# Patient Record
Sex: MALE | Race: WHITE | NOT HISPANIC OR LATINO | Employment: FULL TIME
[De-identification: names, ages, dates, MRNs, and addresses within clinical notes are randomized per-mention and may not be internally consistent; named-entity substitution may affect disease eponyms.]

---

## 2020-03-05 ENCOUNTER — RECORDS - HEALTHEAST (OUTPATIENT)
Dept: ADMINISTRATIVE | Facility: OTHER | Age: 45
End: 2020-03-05

## 2020-03-12 ENCOUNTER — RECORDS - HEALTHEAST (OUTPATIENT)
Dept: ADMINISTRATIVE | Facility: OTHER | Age: 45
End: 2020-03-12

## 2020-03-26 ENCOUNTER — RECORDS - HEALTHEAST (OUTPATIENT)
Dept: ADMINISTRATIVE | Facility: OTHER | Age: 45
End: 2020-03-26

## 2020-08-20 ENCOUNTER — OFFICE VISIT - HEALTHEAST (OUTPATIENT)
Dept: FAMILY MEDICINE | Facility: CLINIC | Age: 45
End: 2020-08-20

## 2020-08-20 DIAGNOSIS — M54.12 CERVICAL NEUROPATHIC PAIN: ICD-10-CM

## 2020-08-20 DIAGNOSIS — F17.200 TOBACCO USE DISORDER: ICD-10-CM

## 2021-05-30 ENCOUNTER — HEALTH MAINTENANCE LETTER (OUTPATIENT)
Age: 46
End: 2021-05-30

## 2021-06-04 VITALS
WEIGHT: 182 LBS | DIASTOLIC BLOOD PRESSURE: 60 MMHG | RESPIRATION RATE: 12 BRPM | SYSTOLIC BLOOD PRESSURE: 108 MMHG | HEART RATE: 84 BPM | BODY MASS INDEX: 25.75 KG/M2

## 2021-06-10 NOTE — PROGRESS NOTES
Subjective:       Constance Frost is a 45 y.o. male who presents for evaluation of left sided neck pain radiating to the left shoulder. Event that precipitated these symptoms: none known, Patient questions if he might of slept on it wrong. Onset of symptoms was 1 week ago, and have been unchanged since that time. Current symptoms are pain in left lateral neck and medial to left shoulder blade (aching and burning in character; 8/10 in severity). Patient denies Upper extremity weakness, numbness, or tingling or headaches.  Patient has had recurrent self limited episodes of neck pain in the past. Previous treatments: physical therapy for previous episodes of neck pain.  He has the exercises that were previously recommended to him, and we will continue to try to perform these as pain improves.  Patient also is considering consulting with his sister's chiropractor to see if adjustment can be helpful.   The following portions of the patient's history were reviewed and updated as appropriate: allergies, current medications, past social history and problem list.    Review of Systems  Pertinent items are noted in HPI.      Objective:      /60   Pulse 84   Resp 12   Wt 182 lb (82.6 kg)   BMI 25.75 kg/m    General:   alert, appears stated age, cooperative and no distress   External Deformity:  absent   ROM Cervical Spine:  flexion to 45 degrees, extension to minimal degrees, left rotation to 80 degrees, right rotation to 40 degrees, left lateral bending to 45 degrees and right lateral bending to 45 degrees   Midline Tenderness:  absent midline   Paraspinous tenderness:  moderate on the left   UE Neurologic Exam:  normal strength, normal sensation, normal reflexes     X-ray of the cervical spine: Not indicated      Assessment:        cervical strain with left radiculopathy     Plan:      Discussed the cervical pain, its course and treatment.    Discussed appropriate exercises as per previous physical therapy regimen.       Given lack of improvement with use of over-the-counter NSAIDs and persistent radicular component to patient's pain, he is prescribed a Medrol Dosepak to be taken each morning for the next 7 days with food.  Reviewed potential side effects of this medication as well as reasonable expectations of it.     Consider chiropractic visit if symptoms are not improved with above measures. Discussed apprehension regarding aggressive adjustment of c-spine.    Follow up in 1-2 weeks if symptoms are not improving.    Jaciel Silver MD

## 2021-06-18 NOTE — PATIENT INSTRUCTIONS - HE
Patient Instructions by Jaciel Silver MD at 8/20/2020  3:20 PM     Author: Jaciel Silver MD Service: -- Author Type: Physician    Filed: 8/24/2020  7:01 PM Encounter Date: 8/20/2020 Status: Signed    : Jaciel Silver MD (Physician)       Patient Education     Understanding Cervical Radiculopathy    Cervical radiculopathy is irritation or inflammation of a nerve root in the neck. It causes neck pain and other symptoms that may spread into the chest or down the arm. To understand this condition, it helps to understand the parts of the spine:    Vertebrae. These are bones that stack to form the spine. The cervical spine contains the 7 vertebrae in the neck.    Disks. These are soft pads of tissue between the vertebrae. They act as shock absorbers for the spine.    The spinal canal. This is a tunnel formed within the stacked vertebrae. The spinal cord runs through this canal.    Nerves. These branch off the spinal cord. As they leave the spinal canal, nerves pass through openings between the vertebrae. The nerve root is the part of the nerve that is closest to the spinal cord.   With cervical radiculopathy, nerve roots in the neck become irritated. This leads to pain and symptoms that can travel to the nerves that go from the spinal cord down the arms and into the torso.  What causes cervical radiculopathy?  Aging, injury, poor posture, and other issues can lead to problems in the neck. These problems may then irritate nerve roots. These include:    Damage to a disk in the cervical spine. The damaged disk may then press on nearby nerve roots.    Degeneration from wear and tear, and aging. This can lead to narrowing (stenosis) of the openings between the vertebrae. The narrowed openings press on nerve roots as they leave the spinal canal.    An unstable spine. This is when a vertebra slips forward. It can then press on a nerve root.  There are other, less common causes of pressure on nerves in  the neck. These include infection, cysts, and tumors.  Symptoms of cervical radiculopathy  These include:    Neck pain    Pain, numbness, tingling, or weakness that travels down the arm    Loss of neck movement    Muscle spasms  Treatment for cervical radiculopathy  In most cases, your healthcare provider will first try treatments that help relieve symptoms. These may include:    Prescription or over-the-counter pain medicines. These help relieve pain and swelling.    Cold packs. These help reduce pain.    Resting. This involves avoiding positions and activities that increase pain.    Neck brace (cervical collar). This can help relieve inflammation and pain.    Physical therapy, including exercises and stretches. This can help decrease pain and increase movement and function.    Shots of medicinesaround the nerve roots. This is done to help relieve symptoms for a time.  In some cases, your healthcare provider may advise surgery to fix the underlying problem. This depends on the cause, the symptoms, and how long the pain has lasted.  Possible complications  Over time, an irritated and inflamed nerve may become damaged. This may lead to long-lasting (permanent) numbness or weakness. If symptoms change suddenly or get worse, be sure to let your healthcare provider know.     When to call your healthcare provider  Call your healthcare provider right away if you have any of these:    New pain or pain that gets worse    New or increasing weakness, numbness, or tingling in your arm or hand    Bowel or bladder changes   Date Last Reviewed: 3/10/2016    6387-3931 [employer]. [address] [place] [address], [place] [address]. All rights reserved. This information is not intended as a substitute for professional medical care. Always follow your healthcare professional's instructions.

## 2021-09-19 ENCOUNTER — HEALTH MAINTENANCE LETTER (OUTPATIENT)
Age: 46
End: 2021-09-19

## 2022-06-26 ENCOUNTER — HEALTH MAINTENANCE LETTER (OUTPATIENT)
Age: 47
End: 2022-06-26

## 2022-11-21 ENCOUNTER — HEALTH MAINTENANCE LETTER (OUTPATIENT)
Age: 47
End: 2022-11-21

## 2023-06-28 ENCOUNTER — APPOINTMENT (OUTPATIENT)
Dept: RADIOLOGY | Facility: HOSPITAL | Age: 48
End: 2023-06-28
Attending: STUDENT IN AN ORGANIZED HEALTH CARE EDUCATION/TRAINING PROGRAM
Payer: COMMERCIAL

## 2023-06-28 ENCOUNTER — HOSPITAL ENCOUNTER (EMERGENCY)
Facility: HOSPITAL | Age: 48
Discharge: HOME | End: 2023-06-28
Payer: COMMERCIAL

## 2023-06-28 VITALS
TEMPERATURE: 99.7 F | OXYGEN SATURATION: 95 % | BODY MASS INDEX: 25.77 KG/M2 | WEIGHT: 180 LBS | DIASTOLIC BLOOD PRESSURE: 79 MMHG | HEIGHT: 70 IN | SYSTOLIC BLOOD PRESSURE: 121 MMHG | HEART RATE: 80 BPM | RESPIRATION RATE: 18 BRPM

## 2023-06-28 DIAGNOSIS — S93.422A SPRAIN OF DELTOID LIGAMENT OF LEFT ANKLE, INITIAL ENCOUNTER: ICD-10-CM

## 2023-06-28 PROBLEM — R21 RASH AND OTHER NONSPECIFIC SKIN ERUPTION: Status: ACTIVE | Noted: 2023-06-28

## 2023-06-28 PROBLEM — B35.9 DERMATOPHYTOSIS: Status: ACTIVE | Noted: 2023-06-28

## 2023-06-28 PROBLEM — M25.561 PAIN IN RIGHT KNEE: Status: ACTIVE | Noted: 2023-06-28

## 2023-06-28 PROBLEM — L28.0 LICHEN SIMPLEX CHRONICUS: Status: ACTIVE | Noted: 2023-06-28

## 2023-06-28 PROBLEM — M77.9 ENTHESOPATHY, UNSPECIFIED: Status: ACTIVE | Noted: 2023-06-28

## 2023-06-28 PROBLEM — L40.9 PSORIASIS, UNSPECIFIED: Status: ACTIVE | Noted: 2023-06-28

## 2023-06-28 PROBLEM — M77.9: Status: ACTIVE | Noted: 2023-06-28

## 2023-06-28 PROBLEM — F17.200 NICOTINE DEPENDENCE: Status: ACTIVE | Noted: 2023-06-28

## 2023-06-28 PROBLEM — Z86.19 HISTORY OF LYME DISEASE: Status: ACTIVE | Noted: 2023-06-28

## 2023-06-28 PROCEDURE — 250N000013 HC RX MED GY IP 250 OP 250 PS 637

## 2023-06-28 PROCEDURE — 73610 X-RAY EXAM OF ANKLE: CPT | Mod: LT

## 2023-06-28 PROCEDURE — 73630 X-RAY EXAM OF FOOT: CPT | Mod: LT

## 2023-06-28 PROCEDURE — 29515 APPLICATION SHORT LEG SPLINT: CPT

## 2023-06-28 PROCEDURE — 99284 EMERGENCY DEPT VISIT MOD MDM: CPT | Mod: 25

## 2023-06-28 RX ORDER — IBUPROFEN 600 MG/1
600 TABLET, FILM COATED ORAL ONCE
Status: COMPLETED | OUTPATIENT
Start: 2023-06-28 | End: 2023-06-28

## 2023-06-28 RX ADMIN — IBUPROFEN 600 MG: 600 TABLET, FILM COATED ORAL at 10:52

## 2023-06-28 ASSESSMENT — ENCOUNTER SYMPTOMS
JOINT SWELLING: 1
FEVER: 0
WOUND: 0
CHILLS: 0
WEAKNESS: 0
COLOR CHANGE: 0
ARTHRALGIAS: 1
NUMBNESS: 1

## 2023-06-28 ASSESSMENT — ACTIVITIES OF DAILY LIVING (ADL): ADLS_ACUITY_SCORE: 36

## 2023-06-28 NOTE — ED PROVIDER NOTES
EMERGENCY DEPARTMENT ENCOUNTER      NAME: Constance Frost  AGE: 48 year old male  YOB: 1975  MRN: [id number]  EVALUATION DATE & TIME: 6/28/2023  9:52 AM    PCP: System, Provider Not In    ED PROVIDER: Mandi Gillespie PA-C      Chief Complaint   Patient presents with     Ankle Pain         FINAL IMPRESSION:  1. Sprain of deltoid ligament of left ankle, initial encounter          ED COURSE & MEDICAL DECISION MAKING:    10:34 AM I met with the patient, obtained history, performed an initial exam, and discussed options and plan for diagnostics and treatment here in the ED. I discussed the plan for discharge with the patient, and patient is agreeable. We discussed supportive cares at home and reasons for return to the ER including new or worsening symptoms. All questions and concerns addressed. Patient to be discharged by RN.    Pertinent Labs & Imaging studies reviewed. (See chart for details)  48 year old male presents to the Emergency Department for evaluation of left ankle pain s/p twisting it while at work a 2 days ago. Upon exam, patient is afebrile, hypertensive, appears uncomfortable, but in no acute distress. Left medial malleolar tenderness to palpation with mild edema, no ecchymosis, erythema, warmth, or crepitus. ROM limited secondary to pain. Differential diagnosis includes but not limited to fracture, dislocation, sprain, contusion. Based on patient's presenting symptoms, imaging was ordered. XR per my independent interpretation revealed no evidence of fracture or dislocation, radiology confirmed.    Patient was treated with ibuprofen upon arrival with pain relief, BP also improved. Symptoms and workup most consistent with ankle sprain. Patient was made aware of the above findings. Patient placed in CAM boot and ACE wrap. Patient able to ambulate in the ED prior to discharge. Plan to discharge patient home with CAM boot and ACE wrap, strict return precautions, and close follow up with their PCP  "as needed for reevaluation and ongoing management. The patient was stable and well appearing upon discharge. The patient was advised to return to the ER if any new or worsening symptoms develop. The patient verbalizes understanding and agrees with the plan.     Medical Decision Making    History:    Supplemental history from: Documented in chart, if applicable    External Record(s) reviewed: Documented in chart, if applicable.    Work Up:    Chart documentation includes differential considered and any EKGs or imaging independently interpreted by provider, where specified.    In additional to work up documented, I considered the following work up: Documented in chart, if applicable.    External consultation:    Discussion of management with another provider: Documented in chart, if applicable    Complicating factors:    Care impacted by chronic illness: Smoking / Nicotine Use    Care affected by social determinants of health: N/A    Disposition considerations: Discharge. I recommended prescription strength medication(s) prescription analgesia (oxycodone), but patient declined .. See documentation for any additional details.        MEDICATIONS GIVEN IN THE EMERGENCY:  Medications   ibuprofen (ADVIL/MOTRIN) tablet 600 mg (600 mg Oral $Given 6/28/23 1052)       NEW PRESCRIPTIONS STARTED AT TODAY'S ER VISIT  New Prescriptions    No medications on file          =================================================================    HPI    Patient information was obtained from: patient     Use of : N/A      Constance Frost is a 48 year old male with a pertinent history of nicotine dependence who presents to this ED via walk-in for evaluation of left ankle pain.    The patient presents with left ankle pain that began 2 days ago at work. He was unloading lumber from a truck, took a step, and heard a \"pop\" when his foot twisted outward while throwing the wood. He kept walking on it for 2 more hours until the end of " "the day. Yesterday, while walking he heard \"popping everywhere\" so he bought a [employer] ankle compression sleeve. However, he kept walking on it despite the pain. Today, he woke up and was unable to walk on his left ankle due to the pain and swelling so he came to the ED. He also has numbness on his left great toe and along the dorsomedial side of his left foot. He has a history of psoriatic arthritis that sometimes causes issues with his ankles but he does not think this was involved with this injury. He has not taken any pain medications for this yet.    He denies skin change or any other complaints at this time. No fevers, chills, redness, warmth, or wound. No associated weakness, but pain with ambulation/weight bearing.     REVIEW OF SYSTEMS   Review of Systems   Constitutional: Negative for chills and fever.   Musculoskeletal: Positive for arthralgias and joint swelling.        Positive for left ankle pain   Skin: Negative for color change, pallor and wound.   Neurological: Positive for numbness (Dorsum of left foot). Negative for weakness.   All other systems reviewed and are negative.       PAST MEDICAL HISTORY:  Past Medical History:   Diagnosis Date     Eczema 2/25/2015     Pneumonia      Pneumonia, organism unspecified(486) 12/28/2015     S/P appendectomy 12/28/2015     Tobacco use disorder     quit 3/2015       PAST SURGICAL HISTORY:  Past Surgical History:   Procedure Laterality Date     LAPAROSCOPIC APPENDECTOMY             CURRENT MEDICATIONS:    methylPREDNISolone (MEDROL DOSEPACK) 4 mg tablet        ALLERGIES:  Allergies   Allergen Reactions     Amoxicillin-Pot Clavulanate Hives       FAMILY HISTORY:  Family History   Problem Relation Age of Onset     Diabetes Mother      Breast Cancer Mother      Diabetes Father      Colon Cancer Other        SOCIAL HISTORY:   Social History     Socioeconomic History     Marital status:    Tobacco Use     Smoking status: Former     Types: Cigarettes     " "Quit date: 3/20/2015     Years since quittin.2     Smokeless tobacco: Current     Types: Chew   Substance and Sexual Activity     Alcohol use: Yes     Comment: Alcoholic Drinks/day: CAGE negative      Drug use: No       VITALS:  /79   Pulse 80   Temp 99.7  F (37.6  C)   Resp 18   Ht 1.765 m (5' 9.5\")   Wt 81.6 kg (180 lb)   SpO2 95%   BMI 26.20 kg/m      PHYSICAL EXAM    Constitutional:  Alert, in no acute distress. Cooperative.  EYES: Conjunctivae clear.  HENT:  Atraumatic, normocephalic.  Respiratory:  Respirations even, unlabored, in no acute respiratory distress.  Cardiovascular:  Regular rate, good peripheral perfusion.  GI: Soft, flat, non-distended.  Musculoskeletal: Left lower extremity: tenderness to palpation over medial malleolus with mild associated swelling. No overlying erythema, warmth, purulence, fluctuance, ecchymosis, crepitus, or obvious bony deformity. ROM limited secondary to pain. Paresthesia of medial aspect of foot distally into great toe, no overt numbness. Cap refill <2 seconds. 2+ PT and DP pulses bilaterally. Compartments soft. 5/5 strength.  Integument: Warm, Dry.   Neurologic:  Alert & oriented. No focal deficits noted.  Psych: Normal mood and affect.      LAB:  All pertinent labs reviewed and interpreted.  Results for orders placed or performed during the hospital encounter of 23   XR Ankle Left G/E 3 Views    Impression    IMPRESSION: The left ankle is negative for fracture or disruption of ankle mortise. The left foot is negative for fracture.   XR Foot Left G/E 3 Views    Impression    IMPRESSION: The left ankle is negative for fracture or disruption of ankle mortise. The left foot is negative for fracture.       RADIOLOGY:  Reviewed all pertinent imaging. Please see official radiology report.  XR Foot Left G/E 3 Views   Final Result   IMPRESSION: The left ankle is negative for fracture or disruption of ankle mortise. The left foot is negative for fracture.    "   XR Ankle Left G/E 3 Views   Final Result   IMPRESSION: The left ankle is negative for fracture or disruption of ankle mortise. The left foot is negative for fracture.        I, Stevie Tamayo, am serving as a scribe to document services personally performed by Mandi Gillespie PA-C based on my observation and the provider's statements to me. I, Mandi Gillespie PA-C, attest that Stevie Tamayo is acting in a scribe capacity, has observed my performance of the services and has documented them in accordance with my direction.    Mandi Gillespie PA-C  [facility] EMERGENCY DEPARTMENT  [facility] [facility] [id number] [phone]      Mandi Gillespie PA-C  06/28/23 5721

## 2023-06-28 NOTE — Clinical Note
Constance Frost was seen and treated in our emergency department on 6/28/2023.  He may return to work on 06/29/2023.  Please allow for frequent breaks as needed to elevate leg and avoid prolonged standing/ambulation.      If you have any questions or concerns, please don't hesitate to call.      Mandi Gillespie PA-C

## 2023-06-28 NOTE — DISCHARGE INSTRUCTIONS
You were seen in the ER for left ankle pain. Your XR showed no acute fracture.     Rest, ice/heat, compression with ACE wrap, CAM Boot for walking, elevate your extremity, and increase activity as tolerates. You may use topical Icy Hot or Lidoderm as needed. You may use ibuprofen for swelling/pain and Tylenol as needed for pain.    Tylenol (Acetaminophen) Discharge Instructions:  You may take 2 tablets of regular strength, over-the-counter, Tylenol (acetaminophen) every 4-6 hours as needed for pain.  Take no more than 4000 mg of Tylenol in a 24-hour period.      Avoid taking more than 1 acetaminophen-containing product at a time and be aware that many over-the-counter medications contain a combination of acetaminophen and other products.  If you are taking Tylenol in addition to a combination product please keep track of your daily acetaminophen dose to make sure you do not exceed the recommended 4000 mg.  Taking too much acetaminophen can cause permanent damage to your liver.    Ibuprofen/Naproxen Discharge Instructions:  You may take ibuprofen for pain control.  The maximum dose of (ibuprofen is 3200 mg ) in a 24-hour period.    Take this medication with food to prevent stomach irritation.  With long-term use this medication can irritate the stomach causing pain and lead to development of a stomach ulcer.  If you notice stomach pain or vomiting of coffee-ground colored vomit or blood, please be seen by a healthcare provider.  Attempt to use this medication for the shortest time possible.      Follow-up with your primary care provider for reevaluation within 1 week for reevaluation as needed    Return to the emergency department for any new or worsening symptoms including increased pain, redness/warmth/drainage/swelling, fever/chills, new weakness, numbness/tingling, decreased range of motion, cold extremity, or any other concerning symptoms.     Take Care!  - Mandi Gillespie PA-C

## 2023-06-28 NOTE — ED TRIAGE NOTES
Two days ago hurt ankle at work, twisted outward while carrying wood. Worked through it yesterday. Noted swelling yesterday. More painful now, cant stand or walk. This morning noted bruising. Tingling to foot, painful to touch.

## 2023-07-09 ENCOUNTER — HEALTH MAINTENANCE LETTER (OUTPATIENT)
Age: 48
End: 2023-07-09

## 2023-12-25 ENCOUNTER — TRANSFERRED RECORDS (OUTPATIENT)
Dept: HEALTH INFORMATION MANAGEMENT | Facility: CLINIC | Age: 48
End: 2023-12-25

## 2024-01-05 ENCOUNTER — TRANSFERRED RECORDS (OUTPATIENT)
Dept: HEALTH INFORMATION MANAGEMENT | Facility: CLINIC | Age: 49
End: 2024-01-05

## 2024-08-13 ENCOUNTER — HOSPITAL ENCOUNTER (OUTPATIENT)
Dept: RADIOLOGY | Facility: HOSPITAL | Age: 49
Discharge: HOME | End: 2024-08-13
Attending: PHYSICAL MEDICINE & REHABILITATION | Admitting: PHYSICAL MEDICINE & REHABILITATION
Payer: OTHER GOVERNMENT

## 2024-08-13 DIAGNOSIS — M13.80 OTHER SPECIFIED ARTHRITIS, UNSPECIFIED SITE: ICD-10-CM

## 2024-08-13 PROCEDURE — 73120 X-RAY EXAM OF HAND: CPT | Mod: LT

## 2024-08-31 ENCOUNTER — HEALTH MAINTENANCE LETTER (OUTPATIENT)
Age: 49
End: 2024-08-31

## 2025-01-16 ENCOUNTER — OFFICE VISIT (OUTPATIENT)
Dept: FAMILY MEDICINE | Facility: CLINIC | Age: 50
End: 2025-01-16
Payer: COMMERCIAL

## 2025-01-16 VITALS
HEIGHT: 69 IN | OXYGEN SATURATION: 98 % | DIASTOLIC BLOOD PRESSURE: 72 MMHG | SYSTOLIC BLOOD PRESSURE: 110 MMHG | BODY MASS INDEX: 26.81 KG/M2 | TEMPERATURE: 97.5 F | HEART RATE: 70 BPM | RESPIRATION RATE: 21 BRPM | WEIGHT: 181 LBS

## 2025-01-16 DIAGNOSIS — R53.83 FATIGUE, UNSPECIFIED TYPE: ICD-10-CM

## 2025-01-16 DIAGNOSIS — Z00.00 ROUTINE GENERAL MEDICAL EXAMINATION AT A HEALTH CARE FACILITY: Primary | ICD-10-CM

## 2025-01-16 DIAGNOSIS — L40.50 PSORIASIS WITH ARTHROPATHY (H): ICD-10-CM

## 2025-01-16 DIAGNOSIS — J33.9 NASAL POLYP: ICD-10-CM

## 2025-01-16 DIAGNOSIS — R06.83 SNORING: ICD-10-CM

## 2025-01-16 DIAGNOSIS — C86.60 LYMPHOMATOID PAPULOSIS: ICD-10-CM

## 2025-01-16 DIAGNOSIS — H93.13 TINNITUS, BILATERAL: ICD-10-CM

## 2025-01-16 DIAGNOSIS — B07.8 COMMON WART: ICD-10-CM

## 2025-01-16 DIAGNOSIS — M19.012 ARTHRITIS OF BOTH SHOULDER REGIONS: ICD-10-CM

## 2025-01-16 DIAGNOSIS — M19.011 ARTHRITIS OF BOTH SHOULDER REGIONS: ICD-10-CM

## 2025-01-16 PROBLEM — M25.561 PAIN IN RIGHT KNEE: Status: RESOLVED | Noted: 2023-06-28 | Resolved: 2025-01-16

## 2025-01-16 PROBLEM — R21 RASH AND OTHER NONSPECIFIC SKIN ERUPTION: Status: RESOLVED | Noted: 2023-06-28 | Resolved: 2025-01-16

## 2025-01-16 PROBLEM — Z86.19 HISTORY OF LYME DISEASE: Status: RESOLVED | Noted: 2023-06-28 | Resolved: 2025-01-16

## 2025-01-16 PROBLEM — F17.200 NICOTINE DEPENDENCE: Status: RESOLVED | Noted: 2023-06-28 | Resolved: 2025-01-16

## 2025-01-16 PROBLEM — G47.30 SLEEP APNEA, UNSPECIFIED: Status: RESOLVED | Noted: 2025-01-16 | Resolved: 2025-01-16

## 2025-01-16 PROBLEM — G47.30 SLEEP APNEA, UNSPECIFIED: Status: ACTIVE | Noted: 2025-01-16

## 2025-01-16 PROBLEM — L28.0 LICHEN SIMPLEX CHRONICUS: Status: RESOLVED | Noted: 2023-06-28 | Resolved: 2025-01-16

## 2025-01-16 PROBLEM — M77.9: Status: RESOLVED | Noted: 2023-06-28 | Resolved: 2025-01-16

## 2025-01-16 RX ORDER — DICLOFENAC SODIUM 75 MG/1
1 TABLET, DELAYED RELEASE ORAL 2 TIMES DAILY PRN
COMMUNITY
Start: 2024-08-28 | End: 2025-01-16

## 2025-01-16 SDOH — HEALTH STABILITY: PHYSICAL HEALTH: ON AVERAGE, HOW MANY DAYS PER WEEK DO YOU ENGAGE IN MODERATE TO STRENUOUS EXERCISE (LIKE A BRISK WALK)?: 5 DAYS

## 2025-01-16 SDOH — HEALTH STABILITY: PHYSICAL HEALTH: ON AVERAGE, HOW MANY MINUTES DO YOU ENGAGE IN EXERCISE AT THIS LEVEL?: 30 MIN

## 2025-01-16 ASSESSMENT — SOCIAL DETERMINANTS OF HEALTH (SDOH): HOW OFTEN DO YOU GET TOGETHER WITH FRIENDS OR RELATIVES?: ONCE A WEEK

## 2025-01-16 NOTE — ASSESSMENT & PLAN NOTE
New patient to me and to the [facility] system today establishing care.  He is a   and [facility] but [facility] with [facility] and no longer wishes to get his medical care there.  [facility] after [facility].  It appears that he has mainly followed with a rheumatologist.  I was not able to see any prior lipid screening or diabetes screening.  We will obtain this at his next visit.  Histories are updated and he is up-to-date on vaccinations.  See separate problems.  He thinks that he had Cologuard testing within the past year, I am unable to see that results, will ask him to bring that result in.

## 2025-01-16 NOTE — ASSESSMENT & PLAN NOTE
Small wart present on his distal right third finger at the site of partial amputation.  This was an injury sustained in 2020 while building a mantle.  He has tried cutting it out himself at home but it keeps coming back.  Liquid nitrogen was used for cryotherapy on this today and discussed repeat treatment at follow-up visit in 1 month.

## 2025-01-16 NOTE — ASSESSMENT & PLAN NOTE
"States he was told he had nasal polyps and that this was contributing to his chronic congestion.  He states he cannot breathe out of his nose in the mornings, it clears up throughout the day.  He has used nasal saline as well as Flonase for quite some time in the past without improvement in his symptoms.  He states he is currently using a \"boom stick\" which has been helpful for his symptoms.  This appears to be a blend of essential oils including eucalyptus, menthol, peppermint that is inhaled through the nose.  He was interested in seeing ENT to see if there is anything else that can be done for his nasal congestion.  "

## 2025-01-16 NOTE — ASSESSMENT & PLAN NOTE
He states this has been a longstanding problem due to his  service.  He has had hearing test in the past but wishes to establish with an ENT through [facility].  Referral was placed.

## 2025-01-16 NOTE — ASSESSMENT & PLAN NOTE
He notes ongoing fatigue for many years.  Likely multifactorial.  [facility] in November [facility].  He is very concerned that his testosterone is low.  He had this [facility] and it was 158 and then 320.  He notes a significant decrease in his muscle mass and increased fat in his abdomen.  His brother had similar symptoms and had low testosterone and has had improvement on testosterone replacement.  He wishes to have his testosterone checked again to see if this could be something that could help him.  I do think that some of his fatigue and loss of muscle mass is due to lack of exercise as he is limited quite a bit by his shoulder pain.

## 2025-01-16 NOTE — ASSESSMENT & PLAN NOTE
Previously followed with Raymundo Raines, rheumatology at [facility].  States he was on methotrexate as well as sulfasalazine in the past but these caused abdominal discomfort.  At next visit, will discuss further and see if he wants a referral to [facility] rheumatology.

## 2025-01-16 NOTE — ASSESSMENT & PLAN NOTE
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  Has been on different topical and oral (methotrexate) agents in the past, states these would always bother his stomach and so he is just dealt with it without treatment.  Currently, he has a few small grouped papules on the lateral aspect of his left middle finger.  He notes he gets them in other places on the left hand and wrist.  He declined further treatment of this right now and did not want to see dermatology given the other issues we are addressing today.

## 2025-01-16 NOTE — PROGRESS NOTES
"Preventive Care Visit  [facility]  Shante Grimes MD, Family Medicine  Jan 16, 2025      Assessment & Plan   Problem List Items Addressed This Visit          Nervous and Auditory    Tinnitus, bilateral     He states this has been a longstanding problem due to his  service.  He has had hearing test in the past but wishes to establish with an ENT through [facility].  Referral was placed.            Respiratory    Snoring     He states he has sleep apnea, often wakes up gasping for air at least a few times a night, snores loudly, deals with daytime sleepiness.  [facility] but no episodes of apnea and so they would not give him a CPAP machine.  They told him he needed an in person sleep study.  He wishes to complete this through [facility].  Referral was placed.         Relevant Orders    Adult Sleep Eval & Management  Referral    Nasal polyp     States he was told he had nasal polyps and that this was contributing to his chronic congestion.  He states he cannot breathe out of his nose in the mornings, it clears up throughout the day.  He has used nasal saline as well as Flonase for quite some time in the past without improvement in his symptoms.  He states he is currently using a \"boom stick\" which has been helpful for his symptoms.  This appears to be a blend of essential oils including eucalyptus, menthol, peppermint that is inhaled through the nose.  He was interested in seeing ENT to see if there is anything else that can be done for his nasal congestion.         Relevant Orders    Adult ENT  Referral       Musculoskeletal and Integumentary    Psoriasis with arthropathy (H)     [facility].  States he was on methotrexate as well as sulfasalazine in the past but these caused abdominal discomfort.  At next visit, will discuss further and see if he wants a referral " to [facility]ology.         Arthritis of both shoulder regions     Chronic, significant arthritis in both shoulders dating back several decades due to injuries from his  service.  He has been sent for physical therapy several times.  [facility] that he [facility] in both shoulders.  He wishes to establish with [facility]opedic group and so referral was placed today.  He states he has had numerous x-rays and declines to have repeat x-rays today.         Relevant Orders    Orthopedic  Referral    Common wart     Small wart present on his distal right third finger at the site of partial amputation.  This was an injury sustained in 2020 while building a mantle.  He has tried cutting it out himself at home but it keeps coming back.  Liquid nitrogen was used for cryotherapy on this today and discussed repeat treatment at follow-up visit in 1 month.         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  Has been on different topical and oral (methotrexate) agents in the past, states these would always bother his stomach and so he is just dealt with it without treatment.  Currently, he has a few small grouped papules on the lateral aspect of his left middle finger.  He notes he gets them in other places on the left hand and wrist.  He declined further treatment of this right now and did not want to see dermatology given the other issues we are addressing today.            Other    Fatigue, unspecified type     He notes ongoing fatigue for many years.  Likely multifactorial.  [facility]  He is very concerned that his testosterone is low.  He had this checked [facility] through the [facility] and it was 158 and then 320.  He notes a significant decrease in his muscle  "mass and increased fat in his abdomen.  His brother had similar symptoms and had low testosterone and has had improvement on testosterone replacement.  He wishes to have his testosterone checked again to see if this could be something that could help him.  I do think that some of his fatigue and loss of muscle mass is due to lack of exercise as he is limited quite a bit by his shoulder pain.         Relevant Orders    Testosterone, total    Routine general medical examination at a health care facility - Primary     New patient to me and to the [facility] system today establishing care.  He is a   and [facility] but is [facility] with their system and no longer wishes to get his medical care there.  [facility] reviewed [facility] the [facility] after his visit.  It appears that he has mainly followed with a rheumatologist.  I was not able to see any prior lipid screening or diabetes screening.  We will obtain this at his next visit.  Histories are updated and he is up-to-date on vaccinations.  See separate problems.  He thinks that he had Cologuard testing within the past year, I am unable to see that results, will ask him to bring that result in.             Patient has been advised of split billing requirements and indicates understanding: Yes    In addition to the preventive visit, 40  minutes of the appointment were spent evaluating and developing a treatment plan for his additional concern(s), including reviewing [facility] from the [facility] and inputting data into epic.       BMI  Estimated body mass index is 27.12 kg/m  as calculated from the following:    Height as of this encounter: 1.74 m (5' 8.5\").    Weight as of this encounter: 82.1 kg (181 lb).   Weight management plan: Discussed healthy diet and exercise guidelines    Counseling  Appropriate preventive services were addressed with this patient via screening, questionnaire, or discussion as appropriate for fall prevention, " nutrition, physical activity, Tobacco-use cessation, social engagement, weight loss and cognition.  Checklist reviewing preventive services available has been given to the patient.  Reviewed patient's diet, addressing concerns and/or questions.   He is at risk for psychosocial distress and has been provided with information to reduce risk.     The longitudinal plan of care for the diagnosis(es)/condition(s) as documented were addressed during this visit. Due to the added complexity in care, I will continue to support Constance in the subsequent management and with ongoing continuity of care.    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 - since 1998 (chest and groin) - has gotten different creams (sulfasalazine, omeprazole)    Sleep apnea - [facility] - could only sleep for 5 hours - told it showed snoring but not apnea, was told he needed an in-person sleep study, wakes up many times (4-5) in the night gasping for air, feels like he is falling    Was [facility] [facility] [place]    Energy levels are gone - did a testosterone test - 158, the next was 320  Muscle mass is way down  Has always been very fit   Brother is 11 months older than him - now on testosterone    Poop test last year - cologuard - 3 years - was normal    Sinus problems - was told he had polyps  Can't breathe out of  "nose in the morning time  Then a lot of congestion  Feels like there is a \"ball\" above the palate  Using boom stick now for his nose  Has tried saline, flonase    Wart on finger - liquid nitrogen    Mood - short - hx PTSD from   Doesn't think it's too bad - gets mad at himself and his body    Health Care Directive  Patient does not have a Health Care Directive: Discussed advance care planning with patient; information given to patient to review.        1/16/2025   General Health   How would you rate your overall physical health? (!) FAIR   Feel stress (tense, anxious, or unable to sleep) To some extent   (!) STRESS CONCERN      1/16/2025   Nutrition   Three or more servings of calcium each day? Yes   Diet: Regular (no restrictions)   How many servings of fruit and vegetables per day? (!) 2-3   How many sweetened beverages each day? (!) 2     Feels like diet is really good          1/16/2025   Exercise   Days per week of moderate/strenous exercise 5 days   Average minutes spent exercising at this level 30 min     Very limited by his shoulders - can't wrestle  Dislocated easily        1/16/2025   Social Factors   Frequency of gathering with friends or relatives Once a week   Worry food won't last until get money to buy more No    No   Food not last or not have enough money for food? No    No   Do you have housing? (Housing is defined as stable permanent housing and does not include staying ouside in a car, in a tent, in an abandoned building, in an overnight shelter, or couch-surfing.) Yes    Yes   Are you worried about losing your housing? No    No   Lack of transportation? No    No   Unable to get utilities (heat,electricity)? No    No       Multiple values from one day are sorted in reverse-chronological order         1/16/2025   Dental   Dentist two times every year? Yes         1/16/2025   TB Screening   Were you born outside of the [place]? Yes         Today's PHQ-2 Score:       1/16/2025     7:41 AM   PHQ-2 " "(  [employer])   Q1: Little interest or pleasure in doing things 1   Q2: Feeling down, depressed or hopeless 0   PHQ-2 Score 1    Q1: Little interest or pleasure in doing things Several days   Q2: Feeling down, depressed or hopeless Not at all   PHQ-2 Score 1       Patient-reported           2025   Substance Use   Alcohol more than 3/day or more than 7/wk No   Do you use any other substances recreationally? (!) CANNABIS PRODUCTS     Social History     Tobacco Use    Smoking status: Former     Current packs/day: 0.00     Average packs/day: 1 pack/day for 21.2 years (21.2 ttl pk-yrs)     Types: Cigarettes     Start date: 1994     Quit date: 3/20/2015     Years since quittin.8    Smokeless tobacco: Former     Types: Chew   Vaping Use    Vaping status: Every Day    Substances: Nicotine    Devices: Disposable   Substance Use Topics    Alcohol use: Yes     Comment: 3 times/year - fishing, hunting,     Drug use: Yes     Types: Marijuana             2025   One time HIV Screening   Previous HIV test? No         2025   STI Screening   New sexual partner(s) since last STI/HIV test? No   ASCVD Risk   The ASCVD Risk score (Erwin DHILLON, et al., 2019) failed to calculate for the following reasons:    Cannot find a previous HDL lab    Cannot find a previous total cholesterol lab        2025   Contraception/Family Planning   Questions about contraception or family planning No        Reviewed and updated as needed this visit by Provider   Tobacco  Allergies  Meds  Problems  Med Hx  Surg Hx  Fam Hx               Objective    Exam  /72 (BP Location: Right arm, Patient Position: Sitting, Cuff Size: Adult Regular)   Pulse 70   Temp 97.5  F (36.4  C) (Tympanic)   Resp 21   Ht 1.74 m (5' 8.5\")   Wt 82.1 kg (181 lb)   SpO2 98%   BMI 27.12 kg/m     Estimated body mass index is 27.12 kg/m  as calculated from the following:    Height as of this encounter: 1.74 m (5' 8.5\").    " Weight as of this encounter: 82.1 kg (181 lb).    Physical Exam  GENERAL: alert and no distress  EYES: Eyes grossly normal to inspection, PERRL and conjunctivae and sclerae normal  HENT: ear canals and TM's normal, nose and mouth without ulcers or lesions  NECK: no adenopathy, no asymmetry, masses, or scars  RESP: lungs clear to auscultation - no rales, rhonchi or wheezes  CV: regular rate and rhythm, normal S1 S2, no S3 or S4, no murmur, click or rub, no peripheral edema  ABDOMEN: soft, nontender, no hepatosplenomegaly, no masses and bowel sounds normal  MS: no gross musculoskeletal defects noted, no edema  SKIN: no suspicious lesions or rashes.  The right middle finger has amputation of the tip that is well-healed, and there is a small circular wart on the tip  NEURO: Normal strength and tone, mentation intact and speech normal  PSYCH: mentation appears normal, affect normal/bright        Signed Electronically by: Shante Grimes MD      Prior to immunization administration, verified patients identity using patient s name and date of birth. Please see Immunization Activity for additional information.     Screening Questionnaire for Adult Immunization    Are you sick today?   No   Do you have allergies to medications, food, a vaccine component or latex?   No   Have you ever had a serious reaction after receiving a vaccination?   No   Do you have a long-term health problem with heart, lung, kidney, or metabolic disease (e.g., diabetes), asthma, a blood disorder, no spleen, complement component deficiency, a cochlear implant, or a spinal fluid leak?  Are you on long-term aspirin therapy?   No   Do you have cancer, leukemia, HIV/AIDS, or any other immune system problem?   No   Do you have a parent, brother, or sister with an immune system problem?   No   In the past 3 months, have you taken medications that affect  your immune system, such as prednisone, other steroids, or anticancer drugs; drugs for the  treatment of rheumatoid arthritis, Crohn s disease, or psoriasis; or have you had radiation treatments?   Yes   Have you had a seizure, or a brain or other nervous system problem?   No   During the past year, have you received a transfusion of blood or blood    products, or been given immune (gamma) globulin or antiviral drug?   No   For women: Are you pregnant or is there a chance you could become       pregnant during the next month?   No   Have you received any vaccinations in the past 4 weeks?   No     Immunization questionnaire was positive for at least one answer.  Notified provider.      Patient instructed to remain in clinic for 15 minutes afterwards, and to report any adverse reactions.     Screening performed by Jaun Amaral MA on 1/16/2025 at 7:55 AM.

## 2025-01-16 NOTE — PATIENT INSTRUCTIONS
Patient Education   Preventive Care Advice   This is general advice given by our system to help you stay healthy. However, your care team may have specific advice just for you. Please talk to your care team about your preventive care needs.  Nutrition  Eat 5 or more servings of fruits and vegetables each day.  Try wheat bread, brown rice and whole grain pasta (instead of white bread, rice, and pasta).  Get enough calcium and vitamin D. Check the label on foods and aim for 100% of the RDA (recommended daily allowance).  Lifestyle  Exercise at least 150 minutes each week  (30 minutes a day, 5 days a week).  Do muscle strengthening activities 2 days a week. These help control your weight and prevent disease.  No smoking.  Wear sunscreen to prevent skin cancer.  Have a dental exam and cleaning every 6 months.  Yearly exams  See your health care team every year to talk about:  Any changes in your health.  Any medicines your care team has prescribed.  Preventive care, family planning, and ways to prevent chronic diseases.  Shots (vaccines)   HPV shots (up to age 26), if you've never had them before.  Hepatitis B shots (up to age 59), if you've never had them before.  COVID-19 shot: Get this shot when it's due.  Flu shot: Get a flu shot every year.  Tetanus shot: Get a tetanus shot every 10 years.  Pneumococcal, hepatitis A, and RSV shots: Ask your care team if you need these based on your risk.  Shingles shot (for age 50 and up)  General health tests  Diabetes screening:  Starting at age 35, Get screened for diabetes at least every 3 years.  If you are younger than age 35, ask your care team if you should be screened for diabetes.  Cholesterol test: At age 39, start having a cholesterol test every 5 years, or more often if advised.  Bone density scan (DEXA): At age 50, ask your care team if you should have this scan for osteoporosis (brittle bones).  Hepatitis C: Get tested at least once in your life.  STIs (sexually  transmitted infections)  Before age 24: Ask your care team if you should be screened for STIs.  After age 24: Get screened for STIs if you're at risk. You are at risk for STIs (including HIV) if:  You are sexually active with more than one person.  You don't use condoms every time.  You or a partner was diagnosed with a sexually transmitted infection.  If you are at risk for HIV, ask about PrEP medicine to prevent HIV.  Get tested for HIV at least once in your life, whether you are at risk for HIV or not.  Cancer screening tests  Cervical cancer screening: If you have a cervix, begin getting regular cervical cancer screening tests starting at age 21.  Breast cancer scan (mammogram): If you've ever had breasts, begin having regular mammograms starting at age 40. This is a scan to check for breast cancer.  Colon cancer screening: It is important to start screening for colon cancer at age 45.  Have a colonoscopy test every 10 years (or more often if you're at risk) Or, ask your provider about stool tests like a FIT test every year or Cologuard test every 3 years.  To learn more about your testing options, visit:   .  For help making a decision, visit:   https://bit.ly/pk69872.  Prostate cancer screening test: If you have a prostate, ask your care team if a prostate cancer screening test (PSA) at age 55 is right for you.  Lung cancer screening: If you are a current or former smoker ages 50 to 80, ask your care team if ongoing lung cancer screenings are right for you.  For informational purposes only. Not to replace the advice of your health care provider. Copyright   2023 [facility]. All rights reserved. Clinically reviewed by the [facility] Transitions Program. [employer] [id number] - REV 01/24.  Learning About Stress  What is stress?     Stress is your body's response to a hard situation. Your body can have a physical, emotional, or mental response. Stress is a fact of life for most people, and it  affects everyone differently. What causes stress for you may not be stressful for someone else.  A lot of things can cause stress. You may feel stress when you go on a job interview, take a test, or run a race. This kind of short-term stress is normal and even useful. It can help you if you need to work hard or react quickly. For example, stress can help you finish an important job on time.  Long-term stress is caused by ongoing stressful situations or events. Examples of long-term stress include long-term health problems, ongoing problems at work, or conflicts in your family. Long-term stress can harm your health.  How does stress affect your health?  When you are stressed, your body responds as though you are in danger. It makes hormones that speed up your heart, make you breathe faster, and give you a burst of energy. This is called the fight-or-flight stress response. If the stress is over quickly, your body goes back to normal and no harm is done.  But if stress happens too often or lasts too long, it can have bad effects. Long-term stress can make you more likely to get sick, and it can make symptoms of some diseases worse. If you tense up when you are stressed, you may develop neck, shoulder, or low back pain. Stress is linked to high blood pressure and heart disease.  Stress also harms your emotional health. It can make you hernandez, tense, or depressed. Your relationships may suffer, and you may not do well at work or school.  What can you do to manage stress?  You can try these things to help manage stress:   Do something active. Exercise or activity can help reduce stress. Walking is a great way to get started. Even everyday activities such as housecleaning or yard work can help.  Try yoga or jennifer chi. These techniques combine exercise and meditation. You may need some training at first to learn them.  Do something you enjoy. For example, listen to music or go to a movie. Practice your hobby or do volunteer  "work.  Meditate. This can help you relax, because you are not worrying about what happened before or what may happen in the future.  Do guided imagery. Imagine yourself in any setting that helps you feel calm. You can use online videos, books, or a teacher to guide you.  Do breathing exercises. For example:  From a standing position, bend forward from the waist with your knees slightly bent. Let your arms dangle close to the floor.  Breathe in slowly and deeply as you return to a standing position. Roll up slowly and lift your head last.  Hold your breath for just a few seconds in the standing position.  Breathe out slowly and bend forward from the waist.  Let your feelings out. Talk, laugh, cry, and express anger when you need to. Talking with supportive friends or family, a counselor, or a ronda leader about your feelings is a healthy way to relieve stress. Avoid discussing your feelings with people who make you feel worse.  Write. It may help to write about things that are bothering you. This helps you find out how much stress you feel and what is causing it. When you know this, you can find better ways to cope.  What can you do to prevent stress?  You might try some of these things to help prevent stress:  Manage your time. This helps you find time to do the things you want and need to do.  Get enough sleep. Your body recovers from the stresses of the day while you are sleeping.  Get support. Your family, friends, and community can make a difference in how you experience stress.  Limit your news feed. Avoid or limit time on social media or news that may make you feel stressed.  Do something active. Exercise or activity can help reduce stress. Walking is a great way to get started.  Where can you learn more?  Go to https://www.[employer].net/patiented  Enter N032 in the search box to learn more about \"Learning About Stress.\"  Current as of: October 24, 2023  Content Version: 14.3    2024 [employer]. " [Bra Size: _______] : Bra Size: [unfilled]   Care instructions adapted under license by your healthcare professional. If you have questions about a medical condition or this instruction, always ask your healthcare professional. [email or web address], [employer] disclaims any warranty or liability for your use of this information.        [de-identified] : WDDIDI  [de-identified] : ATNC [de-identified] : RICOs [de-identified] : supple [de-identified] : Non labored on RA [de-identified] : Bilateral breasts with good symmetry with no overlying skin changes, noticable rippling or nipple changes/retraction/discharge.  B/l nipple sensation intact.  Left breast with well healed taty-areolar incision at 12 oclock after cyst excision. Implant mobile with firmness and tenderness throughout, most prominent on superior and medial aspect.  Right breast soft without any tenderness

## 2025-01-16 NOTE — ASSESSMENT & PLAN NOTE
He states he has sleep apnea, often wakes up gasping for air at least a few times a night, snores loudly, deals with daytime sleepiness.  [facility] showed [facility] but no episodes of apnea and so they would not give him a CPAP machine.  They told him he needed an in person sleep study.  He wishes to complete this through [facility].  Referral was placed.

## 2025-01-16 NOTE — ASSESSMENT & PLAN NOTE
Chronic, significant arthritis in both shoulders dating back several decades due to injuries from his  service.  He has been sent for physical therapy several times.  [facility] that [facility] in [facility].  He wishes to establish with [facility]'s orthopedic group and so referral was placed today.  He states he has had numerous x-rays and declines to have repeat x-rays today.

## 2025-01-19 LAB — TESTOST SERPL-MCNC: 313 NG/DL (ref 240–950)

## 2025-01-30 ENCOUNTER — OFFICE VISIT (OUTPATIENT)
Dept: ORTHOPEDICS | Facility: CLINIC | Age: 50
End: 2025-01-30
Payer: COMMERCIAL

## 2025-01-30 ENCOUNTER — ANCILLARY PROCEDURE (OUTPATIENT)
Dept: GENERAL RADIOLOGY | Facility: CLINIC | Age: 50
End: 2025-01-30
Attending: STUDENT IN AN ORGANIZED HEALTH CARE EDUCATION/TRAINING PROGRAM
Payer: COMMERCIAL

## 2025-01-30 VITALS — BODY MASS INDEX: 26.66 KG/M2 | HEIGHT: 69 IN | WEIGHT: 180 LBS

## 2025-01-30 DIAGNOSIS — M25.512 CHRONIC LEFT SHOULDER PAIN: Primary | ICD-10-CM

## 2025-01-30 DIAGNOSIS — M19.012 ARTHRITIS OF BOTH SHOULDER REGIONS: ICD-10-CM

## 2025-01-30 DIAGNOSIS — G89.29 CHRONIC LEFT SHOULDER PAIN: Primary | ICD-10-CM

## 2025-01-30 DIAGNOSIS — M19.011 ARTHRITIS OF BOTH SHOULDER REGIONS: ICD-10-CM

## 2025-01-30 PROCEDURE — 73030 X-RAY EXAM OF SHOULDER: CPT | Mod: TC | Performed by: RADIOLOGY

## 2025-01-30 NOTE — PATIENT INSTRUCTIONS
[facility]   [address], [address]  [place] [address] [address] [place] [address]   Appointments: [phone] Appointments: [phone]   Fax: [phone] Fax: [phone]       1. Arthritis of both shoulder regions        For scheduling at [facility] ([facility] [place]), call [phone] or [phone]       Follow up after MRI is completed     Call my office with any questions or concerns, [phone].

## 2025-01-30 NOTE — PROGRESS NOTES
CC: Left shoulder pain    HPI: Patient is a 49-year-old male seen today for evaluation of left shoulder pain.  He believes this started from an injury in the mid 90s while he is in the .  He tripped and fell forward onto outstretched hand.  He has both hands to brace his fall.  Since that time he has had episodic shoulder pain.  He localized the pain to the posterior aspect of his shoulder.  He notes snapping and clicking in his shoulder.  He states that sometimes it feels like it is slipping out of the socket.  This happens with random movements and activities throughout the day.  He takes ibuprofen and oral cannabis for pain.  Not done any recent physical therapy.  No prior injection.  No prior surgery.  [facility]  He does not have any records of this.  Not recall if he got an MRI.    He has a history of psoriasis.  He is otherwise healthy.  He works as a .  He vapes.  He longer smokes cigarettes.  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         Patient Active Problem List   Diagnosis    Dermatophytosis    Enthesopathy, unspecified    Psoriasis with arthropathy (H)    Fatigue, unspecified type    Arthritis of both shoulder regions    Snoring    Nasal polyp    Tinnitus, bilateral    Common wart    Lymphomatoid papulosis    Routine general medical examination at a health care facility          Past Medical History:   Diagnosis Date    Tobacco use disorder     quit 3/2015          Past Surgical History:   Procedure Laterality Date    LAPAROSCOPIC APPENDECTOMY            No current outpatient medications on file.          Allergies   Allergen Reactions    Amoxicillin-Pot Clavulanate Hives          Family History   Problem Relation Age of Onset    Diabetes Mother     Breast Cancer Mother          in     Hashimoto's thyroiditis Mother     Diabetes Father     Breast Cancer Sister     Hashimoto's thyroiditis Sister     Other  Problems  (low testosterone) Brother     Colon Cancer Paternal Grandfather         had lots of cancers, smoke, drank          Social History     Tobacco Use    Smoking status: Former     Current packs/day: 0.00     Average packs/day: 1 pack/day for 21.2 years (21.2 ttl pk-yrs)     Types: Cigarettes     Start date: 1994     Quit date: 3/20/2015     Years since quittin.8    Smokeless tobacco: Former     Types: Chew   Substance Use Topics    Alcohol use: Yes     Comment: 3 times/year - fishing, hunting,             Objective:  Physical Exam:  LUE/: No gross deformity of the shoulder.  No open wounds or lacerations.  No surgical incisions.  No erythema or ecchymosis.  No pain to palpation over the clavicle, AC joint, acromion, or scapular spine.  No pain to palpation over lateral aspect of the shoulder, or long head of the biceps in the bicipital groove.  Pain to palpation focally over the posterior joint line and mild pain to palpation of the posterior lateral aspect of the shoulder.  Passive range of motion 180 degrees forward flexion, 170 degrees abduction, 90 degrees external rotation, L1 internal rotation.  Active range of motion is equivalent to passive range of motion.  5/5 strength in flexion, abduction, internal and external rotation.  Negative cross body for AC joint pain.  Negative O'Sheng's for pain over the AC joint or deep anterior shoulder.  Negative speeds for pain over the bicipital groove.  Negative Jobes for pain.  No apprehension with abduction and external rotation.  Negative Yvonne's test positive for significant pain in the posterior shoulder.  Sensation intact to axillary, radial, median, and ulnar nerve distribution.    Imagin view x-ray of bilateral shoulders today was reviewed.  This shows no fracture or acute bony pathology.  Well-maintained glenohumeral joint space.  No significant AC joint arthrosis.    Assessment and Plan: Patient is a 49-year-old male seen here today  for evaluation of left shoulder pain.  X-rays are negative for fracture or glenohumeral arthritis.  Exam today points towards the posterior joint line.  I discussed with them that differential diagnosis includes posterior labral tear, very early arthritis in the posterior aspect of his shoulder.  Certainly cannot rule out rotator cuff pathology.  Discussed with him that the ultimate diagnostic modality would be an MRI.  I do not see any significant red flag symptoms for a full-thickness rotator cuff tear.  It would certainly be reasonable to start nonoperative management the form of oral anti-inflammatory medication, activity modification, or physical therapy.  Corticosteroid injections may be a option for him.  I would recommend an MRI for definitive diagnosis prior to an injection.  At this time he would like to move forward with MRI.  I will order an MRI without contrast left shoulder.  He will follow-up with me in clinic after this to review the results and discuss treatment options moving forward.      Félix Lentz MD    [facility]   Department of Orthopedic Surgery    Disclaimer: This note consists of symbols derived from keyboarding, dictation and/or voice recognition software. As a result, there may be errors in the script that have gone undetected. Please consider this when interpreting information found in this chart.

## 2025-01-30 NOTE — LETTER
1/30/2025      Constance Frost  [address]  [place] [address]      Dear Colleague,    Thank you for referring your patient, Constance Frost, to the [facility]. Please see a copy of my visit note below.    CC: Left shoulder pain    HPI: Patient is a 49-year-old male seen today for evaluation of left shoulder pain.  He believes this started from an injury in the mid 90s while he is in the .  He tripped and fell forward onto outstretched hand.  He has both hands to brace his fall.  Since that time he has had episodic shoulder pain.  He localized the pain to the posterior aspect of his shoulder.  He notes snapping and clicking in his shoulder.  He states that sometimes it feels like it is slipping out of the socket.  This happens with random movements and activities throughout the day.  He takes ibuprofen and oral cannabis for pain.  Not done any recent physical therapy.  No prior injection.  No prior surgery.  He [facility]  He does not have any records of this.  Not recall if he got an MRI.    He has a history of psoriasis.  He is otherwise healthy.  He works as a .  He vapes.  He longer smokes cigarettes.  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         Patient Active Problem List   Diagnosis     Dermatophytosis     Enthesopathy, unspecified     Psoriasis with arthropathy (H)     Fatigue, unspecified type     Arthritis of both shoulder regions     Snoring     Nasal polyp     Tinnitus, bilateral     Common wart     Lymphomatoid papulosis     Routine general medical examination at a health care facility          Past Medical History:   Diagnosis Date     Tobacco use disorder     quit 3/2015          Past Surgical History:   Procedure Laterality Date     LAPAROSCOPIC APPENDECTOMY            No current outpatient medications on file.          Allergies   Allergen Reactions     Amoxicillin-Pot  Clavulanate Hives          Family History   Problem Relation Age of Onset     Diabetes Mother      Breast Cancer Mother          in      Hashimoto's thyroiditis Mother      Diabetes Father      Breast Cancer Sister      Hashimoto's thyroiditis Sister      Other Problems  (low testosterone) Brother      Colon Cancer Paternal Grandfather         had lots of cancers, smoke, drank          Social History     Tobacco Use     Smoking status: Former     Current packs/day: 0.00     Average packs/day: 1 pack/day for 21.2 years (21.2 ttl pk-yrs)     Types: Cigarettes     Start date: 1994     Quit date: 3/20/2015     Years since quittin.8     Smokeless tobacco: Former     Types: Chew   Substance Use Topics     Alcohol use: Yes     Comment: 3 times/year - fishing, hunting,             Objective:  Physical Exam:  LUE/: No gross deformity of the shoulder.  No open wounds or lacerations.  No surgical incisions.  No erythema or ecchymosis.  No pain to palpation over the clavicle, AC joint, acromion, or scapular spine.  No pain to palpation over lateral aspect of the shoulder, or long head of the biceps in the bicipital groove.  Pain to palpation focally over the posterior joint line and mild pain to palpation of the posterior lateral aspect of the shoulder.  Passive range of motion 180 degrees forward flexion, 170 degrees abduction, 90 degrees external rotation, L1 internal rotation.  Active range of motion is equivalent to passive range of motion.  5/5 strength in flexion, abduction, internal and external rotation.  Negative cross body for AC joint pain.  Negative O'Sheng's for pain over the AC joint or deep anterior shoulder.  Negative speeds for pain over the bicipital groove.  Negative Jobes for pain.  No apprehension with abduction and external rotation.  Negative Yvonne's test positive for significant pain in the posterior shoulder.  Sensation intact to axillary, radial, median, and ulnar nerve  distribution.    Imagin view x-ray of bilateral shoulders today was reviewed.  This shows no fracture or acute bony pathology.  Well-maintained glenohumeral joint space.  No significant AC joint arthrosis.    Assessment and Plan: Patient is a 49-year-old male seen here today for evaluation of left shoulder pain.  X-rays are negative for fracture or glenohumeral arthritis.  Exam today points towards the posterior joint line.  I discussed with them that differential diagnosis includes posterior labral tear, very early arthritis in the posterior aspect of his shoulder.  Certainly cannot rule out rotator cuff pathology.  Discussed with him that the ultimate diagnostic modality would be an MRI.  I do not see any significant red flag symptoms for a full-thickness rotator cuff tear.  It would certainly be reasonable to start nonoperative management the form of oral anti-inflammatory medication, activity modification, or physical therapy.  Corticosteroid injections may be a option for him.  I would recommend an MRI for definitive diagnosis prior to an injection.  At this time he would like to move forward with MRI.  I will order an MRI without contrast left shoulder.  He will follow-up with me in clinic after this to review the results and discuss treatment options moving forward.      Félix Lentz MD    [facility]   Department of Orthopedic Surgery    Disclaimer: This note consists of symbols derived from keyboarding, dictation and/or voice recognition software. As a result, there may be errors in the script that have gone undetected. Please consider this when interpreting information found in this chart.         Again, thank you for allowing me to participate in the care of your patient.        Sincerely,        Félix Lentz MD    Electronically signed

## 2025-02-07 ENCOUNTER — HOSPITAL ENCOUNTER (OUTPATIENT)
Dept: MRI IMAGING | Facility: HOSPITAL | Age: 50
Discharge: HOME | End: 2025-02-07
Attending: STUDENT IN AN ORGANIZED HEALTH CARE EDUCATION/TRAINING PROGRAM | Admitting: STUDENT IN AN ORGANIZED HEALTH CARE EDUCATION/TRAINING PROGRAM
Payer: COMMERCIAL

## 2025-02-07 DIAGNOSIS — M19.011 ARTHRITIS OF BOTH SHOULDER REGIONS: ICD-10-CM

## 2025-02-07 DIAGNOSIS — M19.012 ARTHRITIS OF BOTH SHOULDER REGIONS: ICD-10-CM

## 2025-02-07 PROCEDURE — 73221 MRI JOINT UPR EXTREM W/O DYE: CPT | Mod: LT

## 2025-02-08 ENCOUNTER — MYC MEDICAL ADVICE (OUTPATIENT)
Dept: OTOLARYNGOLOGY | Facility: CLINIC | Age: 50
End: 2025-02-08
Payer: OTHER MISCELLANEOUS

## 2025-02-10 ASSESSMENT — SLEEP AND FATIGUE QUESTIONNAIRES
HOW LIKELY ARE YOU TO NOD OFF OR FALL ASLEEP WHILE LYING DOWN TO REST IN THE AFTERNOON WHEN CIRCUMSTANCES PERMIT: SLIGHT CHANCE OF DOZING
HOW LIKELY ARE YOU TO NOD OFF OR FALL ASLEEP WHILE SITTING INACTIVE IN A PUBLIC PLACE: WOULD NEVER DOZE
HOW LIKELY ARE YOU TO NOD OFF OR FALL ASLEEP WHEN YOU ARE A PASSENGER IN A CAR FOR AN HOUR WITHOUT A BREAK: WOULD NEVER DOZE
HOW LIKELY ARE YOU TO NOD OFF OR FALL ASLEEP WHILE SITTING AND READING: MODERATE CHANCE OF DOZING
HOW LIKELY ARE YOU TO NOD OFF OR FALL ASLEEP WHILE SITTING AND TALKING TO SOMEONE: WOULD NEVER DOZE
HOW LIKELY ARE YOU TO NOD OFF OR FALL ASLEEP WHILE WATCHING TV: MODERATE CHANCE OF DOZING
HOW LIKELY ARE YOU TO NOD OFF OR FALL ASLEEP IN A CAR, WHILE STOPPED FOR A FEW MINUTES IN TRAFFIC: WOULD NEVER DOZE
HOW LIKELY ARE YOU TO NOD OFF OR FALL ASLEEP WHILE SITTING QUIETLY AFTER LUNCH WITHOUT ALCOHOL: WOULD NEVER DOZE

## 2025-02-10 NOTE — PROGRESS NOTES
Chief Complaint   Patient presents with    Consult     Nasal congestion, Nasal polyps, unable to sleep well often wakes up gagging and unable to breath, if sleeping on side experiences tons of pressure in head      History of Present Illness   Constance Frost is a 49 year old male who presents for evaluation. I am seeing this patient in consultation for nasal polyp at the request of the provider Dr. Shante Grimes. The patient reports a past history of being told he has had nasal polyps.  He is not sure if they were seen on imaging or on endoscopy.  He is a  and did have some burn pit exposure.  He is also struggling with snoring and apneic breathing at nighttime.  [facility] but [facility] not [facility] any [facility].  An in lab study was recommended.  He does have a pending appointment with sleep medicine.      The patient reports bilateral nasal obstruction for the past several years.  It seems to alternate sides.  He does have intermittent rhinorrhea and postnasal drainage.  He has had some allergy symptoms but has never been formally tested for allergies.  He reports a long history of decreased sense of smell.  He does not have any problems with taste.  He does struggle with facial pain and pressure that usually is worse when he lies down or lies on 1 side.  He has not had previous nose or sinus surgery.  He is a former smoker, roughly 1 pack/day for around 20 years.  He does currently vape.  He does occasionally use marijuana.    No significant history of epistaxis.  No history of migraine headache.  No history of asthma.  No history of aspirin/NSAID sensitivity.    No previous nose or sinus imaging.     Past Medical History  Patient Active Problem List   Diagnosis    Dermatophytosis    Enthesopathy, unspecified    Psoriasis with arthropathy (H)    Fatigue, unspecified type    Arthritis of both shoulder regions    Snoring    Nasal polyp    Tinnitus, bilateral    Common wart     Lymphomatoid papulosis    Routine general medical examination at a health care facility     Current Medications     Current Outpatient Medications:     budesonide (PULMICORT) 0.5 MG/2ML neb solution, Place 0.5 mg/2 mL budesonide vial in 8 oz normal saline sinus rinse bottle.  Irrigate each nostril with one half of the bottle twice daily., Disp: 360 mL, Rfl: 3    Allergies  No Known Allergies    Social History   Social History     Socioeconomic History    Marital status:    Tobacco Use    Smoking status: Former     Current packs/day: 0.00     Average packs/day: 1 pack/day for 21.2 years (21.2 ttl pk-yrs)     Types: Cigarettes     Start date: 1994     Quit date: 3/20/2015     Years since quittin.9    Smokeless tobacco: Former     Types: Chew   Vaping Use    Vaping status: Every Day    Substances: Nicotine    Devices: Disposable   Substance and Sexual Activity    Alcohol use: Yes     Comment: 3 times/year - fishing, hunting,     Drug use: Yes     Types: Marijuana   Social History Narrative     . Works for [employer] - used to be [employer], now . 5 kids - 2 stepkids - 24, 21, 19, 15, 11. Grandson is 8 months.     Social Drivers of Health     Financial Resource Strain: Low Risk  (2025)    Financial Resource Strain     Within the past 12 months, have you or your family members you live with been unable to get utilities (heat, electricity) when it was really needed?: No   Food Insecurity: Low Risk  (2025)    Food Insecurity     Within the past 12 months, did you worry that your food would run out before you got money to buy more?: No     Within the past 12 months, did the food you bought just not last and you didn t have money to get more?: No   Transportation Needs: Low Risk  (2025)    Transportation Needs     Within the past 12 months, has lack of transportation kept you from medical appointments, getting your medicines, non-medical meetings or  appointments, work, or from getting things that you need?: No   Physical Activity: Sufficiently Active (2025)    Exercise Vital Sign     Days of Exercise per Week: 5 days     Minutes of Exercise per Session: 30 min   Stress: Stress Concern Present (2025)    [employer] - Occupational Stress Questionnaire     Feeling of Stress : To some extent   Social Connections: Unknown (2025)    Social Connection and Isolation Panel [NHANES]     Frequency of Social Gatherings with Friends and Family: Once a week   Interpersonal Safety: Low Risk  (2025)    Interpersonal Safety     Do you feel physically and emotionally safe where you currently live?: Yes     Within the past 12 months, have you been hit, slapped, kicked or otherwise physically hurt by someone?: No     Within the past 12 months, have you been humiliated or emotionally abused in other ways by your partner or ex-partner?: No   Housing Stability: Low Risk  (2025)    Housing Stability     Do you have housing? : Yes     Are you worried about losing your housing?: No       Family History  Family History   Problem Relation Age of Onset    Diabetes Mother     Breast Cancer Mother          in     Hashimoto's thyroiditis Mother     Diabetes Father     Breast Cancer Sister     Hashimoto's thyroiditis Sister     Other Problems  (low testosterone) Brother     Colon Cancer Paternal Grandfather         had lots of cancers, smoke, drank       Review of Systems  As per HPI and PMHx, otherwise 10+ comprehensive system review is negative.    Physical Exam  GENERAL: The patient is a pleasant, cooperative 49 year old male in no acute distress.  HEAD: Normocephalic, atraumatic. Hair and scalp are normal.  EYES: Pupils are equal, round, reactive to light and accommodation. Extraocular movements are intact. The sclera nonicteric without injection. The extraocular structures are normal.  EARS: Normal shape and symmetry. No  tenderness when palpating the mastoid or tragal areas bilaterally. No mastoid erythema or fluctuance. Otoscopic exam on the right reveals a minimal amount of cerumen. The right tympanic membrane is round, intact without evidence of effusion, good landmarks.  No retraction, granulation, or drainage. Otoscopic exam on the left reveals a minimal amount of cerumen. The left tympanic membrane is round, intact without evidence of effusion, good landmarks.  No retraction, granulation, or drainage.  NOSE: Nares are patent.  Nasal mucosa is boggy and inflamed to sticky, inflammatory mucus.  The patient has a leftward nasal septal deviation with a spur posteriorly.  The patient has moderate inferior turbinate hypertrophy.  No nasal cavity masses, polyps, or mucopurulence on anterior rhinoscopy.  NEUROLOGIC: Cranial nerves II through XII are grossly intact. Voice is strong. Patient is House-Brackmann I/VI bilaterally.  CARDIOVASCULAR: Extremities are warm and well-perfused. No significant peripheral edema.  RESPIRATORY: Patient has nonlabored breathing without cough, wheeze, stridor.  PSYCHIATRIC: Patient is alert and oriented. Mood and affect appear normal.  SKIN: Warm and dry. No scalp, face, or neck lesions noted.    Procedure: Flexible Nasal Endoscopy  Indication: Chronic nose and sinus symptoms    To best visualize the sinonasal anatomy and due to the chief complaint and HPI, I proceeded with flexible fiberoptic nasal endoscopy.  The bilateral nasal cavities were anesthetized and decongested. The bilateralnasal cavities were then examined using flexible fiberoptic nasal endoscope. The right nasal cavity was without masses, polyps, or mucopurulence. The right middle turbinate and middle meatus was normal in appearance. The sphenoethmoid recess, inferior meatus, superior meatus, and frontal sinus outflow areas were clear. The left nasal cavity was without masses, polyps, or mucopurulence. The left middle turbinate and  middle meatus was normal in appearance. The sphenoethmoid recess, inferior meatus, superior meatus, and frontal sinus outflow areas were clear. The sinonasalmucosa appeared normal. The nasal septum deviates to the left of the mid septum with a spur that contacts the middle and inferior turbinates. The inferior turbinates were moderately hypertrophied. The nasopharynx had a normal appearance with normal Eustachian tube openings and fossa of Rosenmuller bilaterally.  Minimal adenoid tissue. The scope was removed. The patient tolerated the procedure well.    Assessment and Plan     ICD-10-CM    1. Nasal polyp  J33.9 NASAL ENDOSCOPY, DIAGNOSTIC     Adult ENT  Referral     budesonide (PULMICORT) 0.5 MG/2ML neb solution        It was my pleasure seeing Constance Frost today in clinic. The patient has symptoms of chronic rhinitis/chronic sinusitis without nasal polyposis on endoscopic nasal exam.  We discussed the pathophysiology of chronic rhinosinusitis.  We discussed medical and surgical management.  I feel the patient would benefit from nasal saline irrigations with Budesonide.  We discussed proper nasal saline irrigation technique with Budesonide.  The patient would also benefit from an 80 mg intramuscular injection of Kenalog injection today in office.  We discussed the risks, benefits, options, goals of a intramuscular Kenalog injection today in office including, but not limited to: Risk of pain at injection site, risk of infection, side effects of systemic steroids including blood pressure problems, insulin resistance, weight gain, bone/joint issues, mood alteration.  Patient voiced understanding and is willing to proceed.    e will hold off on allergy evaluation at this time, but would consider in the future if they do not improve with medical management.      I will contact the patient via [employer] in 4 weeks to check his symptoms.  If he has significant improvement with the Kenalog and budesonide  irrigations, we will taper his medical regimen.  If he has had minimal improvement or is not doing better, I would consider sinus CT imaging and a follow-up appointment.      Shyam Chowdhury MD  Department of Otolaryngology-Head and Neck Surgery  [facility]

## 2025-02-10 NOTE — TELEPHONE ENCOUNTER
Spoke to pt and reminded him to fill out questionnaires before tomorrow's appointment.   Noy Avendano RN on 2/10/2025 at 9:33 AM

## 2025-02-11 ENCOUNTER — MYC MEDICAL ADVICE (OUTPATIENT)
Dept: OTOLARYNGOLOGY | Facility: CLINIC | Age: 50
End: 2025-02-11

## 2025-02-11 ENCOUNTER — OFFICE VISIT (OUTPATIENT)
Dept: OTOLARYNGOLOGY | Facility: CLINIC | Age: 50
End: 2025-02-11
Payer: COMMERCIAL

## 2025-02-11 DIAGNOSIS — J33.9 NASAL POLYP: ICD-10-CM

## 2025-02-11 DIAGNOSIS — R51.9 FACE PAIN: ICD-10-CM

## 2025-02-11 DIAGNOSIS — J32.9 CHRONIC SINUSITIS, UNSPECIFIED LOCATION: Primary | ICD-10-CM

## 2025-02-11 PROCEDURE — 31231 NASAL ENDOSCOPY DX: CPT | Performed by: OTOLARYNGOLOGY

## 2025-02-11 PROCEDURE — 99204 OFFICE O/P NEW MOD 45 MIN: CPT | Mod: 25 | Performed by: OTOLARYNGOLOGY

## 2025-02-11 RX ORDER — BUDESONIDE 0.5 MG/2ML
INHALANT ORAL
Qty: 360 ML | Refills: 3 | Status: SHIPPED | OUTPATIENT
Start: 2025-02-11

## 2025-02-11 NOTE — LETTER
2/11/2025      Constance Frost  [address]  [place] [address]      Dear Colleague,    Thank you for referring your patient, Constance Frost, to the [facility]. Please see a copy of my visit note below.    Chief Complaint   Patient presents with     Consult     Nasal congestion, Nasal polyps, unable to sleep well often wakes up gagging and unable to breath, if sleeping on side experiences tons of pressure in head      History of Present Illness   Constance Frost is a 49 year old male who presents for evaluation. I am seeing this patient in consultation for nasal polyp at the request of the provider Dr. Shante Grimes. The patient reports a past history of being told he has had nasal polyps.  He is not sure if they were seen on imaging or on endoscopy.  He is a  and did have some burn pit exposure.  He is also struggling with snoring and apneic breathing at nighttime.  [facility] not [facility] any [facility].  An in lab study was recommended.  He does have a pending appointment with sleep medicine.      The patient reports bilateral nasal obstruction for the past several years.  It seems to alternate sides.  He does have intermittent rhinorrhea and postnasal drainage.  He has had some allergy symptoms but has never been formally tested for allergies.  He reports a long history of decreased sense of smell.  He does not have any problems with taste.  He does struggle with facial pain and pressure that usually is worse when he lies down or lies on 1 side.  He has not had previous nose or sinus surgery.  He is a former smoker, roughly 1 pack/day for around 20 years.  He does currently vape.  He does occasionally use marijuana.    No significant history of epistaxis.  No history of migraine headache.  No history of asthma.  No history of aspirin/NSAID sensitivity.    No previous nose or sinus imaging.     Past Medical History  Patient Active  Problem List   Diagnosis     Dermatophytosis     Enthesopathy, unspecified     Psoriasis with arthropathy (H)     Fatigue, unspecified type     Arthritis of both shoulder regions     Snoring     Nasal polyp     Tinnitus, bilateral     Common wart     Lymphomatoid papulosis     Routine general medical examination at a health care facility     Current Medications     Current Outpatient Medications:      budesonide (PULMICORT) 0.5 MG/2ML neb solution, Place 0.5 mg/2 mL budesonide vial in 8 oz normal saline sinus rinse bottle.  Irrigate each nostril with one half of the bottle twice daily., Disp: 360 mL, Rfl: 3    Allergies  No Known Allergies    Social History   Social History     Socioeconomic History     Marital status:    Tobacco Use     Smoking status: Former     Current packs/day: 0.00     Average packs/day: 1 pack/day for 21.2 years (21.2 ttl pk-yrs)     Types: Cigarettes     Start date: 1994     Quit date: 3/20/2015     Years since quittin.9     Smokeless tobacco: Former     Types: Chew   Vaping Use     Vaping status: Every Day     Substances: Nicotine     Devices: Disposable   Substance and Sexual Activity     Alcohol use: Yes     Comment: 3 times/year - fishing, hunting,      Drug use: Yes     Types: Marijuana   Social History Narrative     . Works for [employer] - used to be [employer], now . 5 kids - 2 stepkids - 24, 21, 19, 15, 11. Grandson is 8 months.     Social Drivers of Health     Financial Resource Strain: Low Risk  (2025)    Financial Resource Strain      Within the past 12 months, have you or your family members you live with been unable to get utilities (heat, electricity) when it was really needed?: No   Food Insecurity: Low Risk  (2025)    Food Insecurity      Within the past 12 months, did you worry that your food would run out before you got money to buy more?: No      Within the past 12 months, did the food you bought just  not last and you didn t have money to get more?: No   Transportation Needs: Low Risk  (2025)    Transportation Needs      Within the past 12 months, has lack of transportation kept you from medical appointments, getting your medicines, non-medical meetings or appointments, work, or from getting things that you need?: No   Physical Activity: Sufficiently Active (2025)    Exercise Vital Sign      Days of Exercise per Week: 5 days      Minutes of Exercise per Session: 30 min   Stress: Stress Concern Present (2025)    [employer] - Occupational Stress Questionnaire      Feeling of Stress : To some extent   Social Connections: Unknown (2025)    Social Connection and Isolation Panel [NHANES]      Frequency of Social Gatherings with Friends and Family: Once a week   Interpersonal Safety: Low Risk  (2025)    Interpersonal Safety      Do you feel physically and emotionally safe where you currently live?: Yes      Within the past 12 months, have you been hit, slapped, kicked or otherwise physically hurt by someone?: No      Within the past 12 months, have you been humiliated or emotionally abused in other ways by your partner or ex-partner?: No   Housing Stability: Low Risk  (2025)    Housing Stability      Do you have housing? : Yes      Are you worried about losing your housing?: No       Family History  Family History   Problem Relation Age of Onset     Diabetes Mother      Breast Cancer Mother          in      Hashimoto's thyroiditis Mother      Diabetes Father      Breast Cancer Sister      Hashimoto's thyroiditis Sister      Other Problems  (low testosterone) Brother      Colon Cancer Paternal Grandfather         had lots of cancers, smoke, drank       Review of Systems  As per HPI and PMHx, otherwise 10+ comprehensive system review is negative.    Physical Exam  GENERAL: The patient is a pleasant, cooperative 49 year old male in no acute  distress.  HEAD: Normocephalic, atraumatic. Hair and scalp are normal.  EYES: Pupils are equal, round, reactive to light and accommodation. Extraocular movements are intact. The sclera nonicteric without injection. The extraocular structures are normal.  EARS: Normal shape and symmetry. No tenderness when palpating the mastoid or tragal areas bilaterally. No mastoid erythema or fluctuance. Otoscopic exam on the right reveals a minimal amount of cerumen. The right tympanic membrane is round, intact without evidence of effusion, good landmarks.  No retraction, granulation, or drainage. Otoscopic exam on the left reveals a minimal amount of cerumen. The left tympanic membrane is round, intact without evidence of effusion, good landmarks.  No retraction, granulation, or drainage.  NOSE: Nares are patent.  Nasal mucosa is boggy and inflamed to sticky, inflammatory mucus.  The patient has a leftward nasal septal deviation with a spur posteriorly.  The patient has moderate inferior turbinate hypertrophy.  No nasal cavity masses, polyps, or mucopurulence on anterior rhinoscopy.  NEUROLOGIC: Cranial nerves II through XII are grossly intact. Voice is strong. Patient is House-Brackmann I/VI bilaterally.  CARDIOVASCULAR: Extremities are warm and well-perfused. No significant peripheral edema.  RESPIRATORY: Patient has nonlabored breathing without cough, wheeze, stridor.  PSYCHIATRIC: Patient is alert and oriented. Mood and affect appear normal.  SKIN: Warm and dry. No scalp, face, or neck lesions noted.    Procedure: Flexible Nasal Endoscopy  Indication: Chronic nose and sinus symptoms    To best visualize the sinonasal anatomy and due to the chief complaint and HPI, I proceeded with flexible fiberoptic nasal endoscopy.  The bilateral nasal cavities were anesthetized and decongested. The bilateralnasal cavities were then examined using flexible fiberoptic nasal endoscope. The right nasal cavity was without masses, polyps, or  mucopurulence. The right middle turbinate and middle meatus was normal in appearance. The sphenoethmoid recess, inferior meatus, superior meatus, and frontal sinus outflow areas were clear. The left nasal cavity was without masses, polyps, or mucopurulence. The left middle turbinate and middle meatus was normal in appearance. The sphenoethmoid recess, inferior meatus, superior meatus, and frontal sinus outflow areas were clear. The sinonasalmucosa appeared normal. The nasal septum deviates to the left of the mid septum with a spur that contacts the middle and inferior turbinates. The inferior turbinates were moderately hypertrophied. The nasopharynx had a normal appearance with normal Eustachian tube openings and fossa of Rosenmuller bilaterally.  Minimal adenoid tissue. The scope was removed. The patient tolerated the procedure well.    Assessment and Plan     ICD-10-CM    1. Nasal polyp  J33.9 NASAL ENDOSCOPY, DIAGNOSTIC     Adult ENT  Referral     budesonide (PULMICORT) 0.5 MG/2ML neb solution        It was my pleasure seeing Constance Frost today in clinic. The patient has symptoms of chronic rhinitis/chronic sinusitis without nasal polyposis on endoscopic nasal exam.  We discussed the pathophysiology of chronic rhinosinusitis.  We discussed medical and surgical management.  I feel the patient would benefit from nasal saline irrigations with Budesonide.  We discussed proper nasal saline irrigation technique with Budesonide.  The patient would also benefit from an 80 mg intramuscular injection of Kenalog injection today in office.  We discussed the risks, benefits, options, goals of a intramuscular Kenalog injection today in office including, but not limited to: Risk of pain at injection site, risk of infection, side effects of systemic steroids including blood pressure problems, insulin resistance, weight gain, bone/joint issues, mood alteration.  Patient voiced understanding and is willing to  proceed.    e will hold off on allergy evaluation at this time, but would consider in the future if they do not improve with medical management.      I will contact the patient via [email or web address] in 4 weeks to check his symptoms.  If he has significant improvement with the Kenalog and budesonide irrigations, we will taper his medical regimen.  If he has had minimal improvement or is not doing better, I would consider sinus CT imaging and a follow-up appointment.      Shyam Chowdhury MD  Department of Otolaryngology-Head and Neck Surgery  [facility]       Again, thank you for allowing me to participate in the care of your patient.        Sincerely,        Shyam Chowdhury MD    Electronically signed

## 2025-02-11 NOTE — PATIENT INSTRUCTIONS
BUDESONIDE IRRIGATION INSTRUCTIONS    You will be starting Budesonide nasal irrigations and will need to obtain the following:      - NeilMed Sinus Rinse 8 oz Kit  - Distilled or filtered water   - Normal saline salt packets  - Budesonide medication     Place filtered or distilled water into the NeilMed bottle up to the fill line (DO NOT USE TAP OR WELL WATER). Place the pre-made salt packet in the 8 oz of saline. Then place the prescription budesonide medication into the bottle. Shake the bottle to suspend into solution. Lean head forward over a sink or a basin. Rinse each side of the nose with one-half of the bottle (each squeeze is about 4 ounces, or one-half of the bottle). Rinse the nose twice daily.     You will follow up with your ENT surgeon in 6-12 weeks to recheck your symptoms. If you are having problems with your irrigations or problems obtaining the medication, please contact your ENT surgeon.    Video example: [email or web address]/[email or web address]

## 2025-02-17 ENCOUNTER — TELEPHONE (OUTPATIENT)
Dept: ORTHOPEDICS | Facility: CLINIC | Age: 50
End: 2025-02-17
Payer: OTHER MISCELLANEOUS

## 2025-02-17 NOTE — TELEPHONE ENCOUNTER
Provider requesting follow up visit for MRI review.     Please contact patient to schedule left shoulder follow up.     Tiff Campa, GILDARDO

## 2025-02-20 NOTE — TELEPHONE ENCOUNTER
Left message for patient to call back and set up an appointment with Dr Lentz to go over results.

## 2025-02-24 ENCOUNTER — OFFICE VISIT (OUTPATIENT)
Dept: ORTHOPEDICS | Facility: CLINIC | Age: 50
End: 2025-02-24
Payer: COMMERCIAL

## 2025-02-24 DIAGNOSIS — M19.012 ARTHRITIS OF BOTH SHOULDER REGIONS: Primary | ICD-10-CM

## 2025-02-24 DIAGNOSIS — M19.011 ARTHRITIS OF BOTH SHOULDER REGIONS: Primary | ICD-10-CM

## 2025-02-24 PROCEDURE — 99213 OFFICE O/P EST LOW 20 MIN: CPT | Performed by: STUDENT IN AN ORGANIZED HEALTH CARE EDUCATION/TRAINING PROGRAM

## 2025-02-24 NOTE — LETTER
2/24/2025      Constance Frost  [address]  [place] [facility] [address]      Dear Colleague,    Thank you for referring your patient, Constance Frost, to the [facility] [place]. Please see a copy of my visit note below.    CC: MRI follow-up    HPI: Patient is a 49-year-old male seen here today for follow-up of left shoulder MRI.  For full history and physical please see my prior note.  In brief review he has had chronic posterior shoulder pain with overhead lifting activities.  He feels that this may have stemmed from an injury in the mid 90s when he was in the .  He tells me today that since our last appointment he has noted more pain over the medial parascapular musculature.  He tells her that both shoulders have been experiencing the symptoms.  He notes that if he has to do a lot of overhead activity or paint a ceiling at work, and shoulders will be throbbing for several days.    Objective:   PE:  LUE: No pain to palpation of the clavicle or AC, acromion.  Mild pain to palpation over the medial paraspinal musculature.  No pain to palpation over the long of the biceps or lateral aspect of the shoulder.  Mild pain to palpation nonfocal over the posterior aspect the shoulder.  Passive range of motion is 170 degrees of forward elevation, 160 degrees abduction, 80 degrees external rotation, L3 internal rotation.  Active range of motion: To passive range of motion.  Jobes is negative for pain.  [place]'s and Rivas produces posterior shoulder pain.  Kemps test negative.    Imaging:   MRI without contrast of the left shoulder from February 7 was reviewed.  This shows no significant rotator cuff pathology.  There is very minimal tendinopathy of the infraspinatus and subscapularis.  I do not see any partial-thickness tearing.  Minimal subacromial bursitis.  No cartilage wear from the humeral head or glenoid.  No labral tear noted.  No long head biceps pathology.  Mild AC joint  arthrosis.    A/P:  Patient is a 49-year-old male seen here today for follow-up of chronic shoulder pain to go over his MRI results.  He continues to have vague posterior shoulder pain as well as now medial parascapular pain in both shoulders.  This is more pronounced with overhead activity.  I did opt to review his MRI today.  His MRI is essentially negative.  It shows minimal tendinopathy of the infraspinatus tendon.  Not appreciate labral pathology.  Not appreciate posterior glenoid OA.  Not appreciate any rotator cuff pathology.  His range of motion is quite good.  I discussed with him today that I suspect his symptoms are coming from scapular dyskinesis.  He certainly does a lot of physical activity.  Discussed with him that overhead activity especially with heavy weights and balancing weights on space is very hard in the shoulders, particularly as patient get into their 50s and 60s.  While there is not an anatomic pathology on MRI to indicate surgery, muscular imbalance between the posterior scapular stabilizers and the larger muscle in the chest can cause shoulder pain and scapular dyskinesia.  I discussed with him that if he is compensating by abnormal scapular movement, this can certainly cause medial parascapular muscular pain.  I Scusset that I do not see an operative intervention at this time based on his MRI and exam.  I do think a well directed course of physical therapy with a physical therapist experienced in treating shoulder pain and athletes and patient to our weightlifters would be very beneficial to him.  I discussed with him that this would be to evaluate his lifting form and the muscular balance on his shoulder in order to help with normalizing his scapular motion.  I also discussed with him that he may have to consider activity modification.  Certainly heavy weight lifting is quite taxing on the shoulder.  Particular pressing activities.  Dumbbells are more taxing than barbell.  We also  discussed corticosteroid injection.  Mikhail at this time I would not recommend an injection into his shoulder as I do not see any pathology to localized with a steroid injection.  Trigger point injections may be an option for him as he progresses through physical therapy.  At this time point he is in agreement with starting physical therapy for her shoulder.  I will refer him to one of our shoulder specialist.    Félix Lentz MD    [facility]   Department of Orthopedic Surgery      Disclaimer: This note consists of symbols derived from keyboarding, dictation and/or voice recognition software. As a result, there may be errors in the script that have gone undetected. Please consider this when interpreting information found in this chart.        Again, thank you for allowing me to participate in the care of your patient.        Sincerely,        Félix Lentz MD    Electronically signed

## 2025-02-25 NOTE — PROGRESS NOTES
CC: MRI follow-up    HPI: Patient is a 49-year-old male seen here today for follow-up of left shoulder MRI.  For full history and physical please see my prior note.  In brief review he has had chronic posterior shoulder pain with overhead lifting activities.  He feels that this may have stemmed from an injury in the mid 90s when he was in the .  He tells me today that since our last appointment he has noted more pain over the medial parascapular musculature.  He tells her that both shoulders have been experiencing the symptoms.  He notes that if he has to do a lot of overhead activity or paint a ceiling at work, and shoulders will be throbbing for several days.    Objective:   PE:  LUE: No pain to palpation of the clavicle or AC, acromion.  Mild pain to palpation over the medial paraspinal musculature.  No pain to palpation over the long of the biceps or lateral aspect of the shoulder.  Mild pain to palpation nonfocal over the posterior aspect the shoulder.  Passive range of motion is 170 degrees of forward elevation, 160 degrees abduction, 80 degrees external rotation, L3 internal rotation.  Active range of motion: To passive range of motion.  Jobes is negative for pain.  [place]'s and Rivas produces posterior shoulder pain.  Kemps test negative.    Imaging:   MRI without contrast of the left shoulder from February 7 was reviewed.  This shows no significant rotator cuff pathology.  There is very minimal tendinopathy of the infraspinatus and subscapularis.  I do not see any partial-thickness tearing.  Minimal subacromial bursitis.  No cartilage wear from the humeral head or glenoid.  No labral tear noted.  No long head biceps pathology.  Mild AC joint arthrosis.    A/P:  Patient is a 49-year-old male seen here today for follow-up of chronic shoulder pain to go over his MRI results.  He continues to have vague posterior shoulder pain as well as now medial parascapular pain in both shoulders.  This is more  pronounced with overhead activity.  I did opt to review his MRI today.  His MRI is essentially negative.  It shows minimal tendinopathy of the infraspinatus tendon.  Not appreciate labral pathology.  Not appreciate posterior glenoid OA.  Not appreciate any rotator cuff pathology.  His range of motion is quite good.  I discussed with him today that I suspect his symptoms are coming from scapular dyskinesis.  He certainly does a lot of physical activity.  Discussed with him that overhead activity especially with heavy weights and balancing weights on space is very hard in the shoulders, particularly as patient get into their 50s and 60s.  While there is not an anatomic pathology on MRI to indicate surgery, muscular imbalance between the posterior scapular stabilizers and the larger muscle in the chest can cause shoulder pain and scapular dyskinesia.  I discussed with him that if he is compensating by abnormal scapular movement, this can certainly cause medial parascapular muscular pain.  I Scusset that I do not see an operative intervention at this time based on his MRI and exam.  I do think a well directed course of physical therapy with a physical therapist experienced in treating shoulder pain and athletes and patient to our weightlifters would be very beneficial to him.  I discussed with him that this would be to evaluate his lifting form and the muscular balance on his shoulder in order to help with normalizing his scapular motion.  I also discussed with him that he may have to consider activity modification.  Certainly heavy weight lifting is quite taxing on the shoulder.  Particular pressing activities.  Dumbbells are more taxing than barbell.  We also discussed corticosteroid injection.  Scusset at this time I would not recommend an injection into his shoulder as I do not see any pathology to localized with a steroid injection.  Trigger point injections may be an option for him as he progresses through  physical therapy.  At this time point he is in agreement with starting physical therapy for her shoulder.  I will refer him to one of our shoulder specialist.    Félix Lentz MD    [facility]   Department of Orthopedic Surgery      Disclaimer: This note consists of symbols derived from keyboarding, dictation and/or voice recognition software. As a result, there may be errors in the script that have gone undetected. Please consider this when interpreting information found in this chart.

## 2025-04-14 ENCOUNTER — MYC MEDICAL ADVICE (OUTPATIENT)
Dept: OTOLARYNGOLOGY | Facility: CLINIC | Age: 50
End: 2025-04-14
Payer: COMMERCIAL

## 2025-04-17 NOTE — TELEPHONE ENCOUNTER
Pt returned call to writer regarding follow-up from appointment with Dr. Chowdhury from 2/11/2025. Pt reports that he continues to have debilitating sinus headaches with a lot of nasal drainage daily. He reports being interested in pursuing an in-lab sleep study as he was unable to successfully complete at [facility]. Pt also interested in obtaining sinus CT per conversation with Dr. Chowdhury at his appointment on 2/11. Writer routed information to Dr. Chowdhury for review and development of treatment plan and informed pt we will contact him with next steps/recommendations and that he should call writer directly should he have any questions, concerns, new or worsening symptoms. Pt verbalized understanding and is in agreement with the plan.         Munira Lyle RN on 4/17/2025 at 10:38 AM

## 2025-04-29 ENCOUNTER — VIRTUAL VISIT (OUTPATIENT)
Dept: SLEEP MEDICINE | Facility: CLINIC | Age: 50
End: 2025-04-29
Payer: COMMERCIAL

## 2025-04-29 VITALS — HEIGHT: 69 IN | WEIGHT: 180 LBS | BODY MASS INDEX: 26.66 KG/M2

## 2025-04-29 DIAGNOSIS — R06.83 SNORING: Primary | ICD-10-CM

## 2025-04-29 DIAGNOSIS — R06.89 GASPING FOR BREATH: ICD-10-CM

## 2025-04-29 DIAGNOSIS — R53.83 FATIGUE, UNSPECIFIED TYPE: ICD-10-CM

## 2025-04-29 DIAGNOSIS — F41.9 ANXIETY: ICD-10-CM

## 2025-04-29 PROCEDURE — 1125F AMNT PAIN NOTED PAIN PRSNT: CPT | Mod: 95 | Performed by: PHYSICIAN ASSISTANT

## 2025-04-29 PROCEDURE — 98002 SYNCH AUDIO-VIDEO NEW MOD 45: CPT | Performed by: PHYSICIAN ASSISTANT

## 2025-04-29 ASSESSMENT — SLEEP AND FATIGUE QUESTIONNAIRES
HOW LIKELY ARE YOU TO NOD OFF OR FALL ASLEEP WHILE WATCHING TV: MODERATE CHANCE OF DOZING
HOW LIKELY ARE YOU TO NOD OFF OR FALL ASLEEP WHILE SITTING AND TALKING TO SOMEONE: WOULD NEVER DOZE
HOW LIKELY ARE YOU TO NOD OFF OR FALL ASLEEP WHILE LYING DOWN TO REST IN THE AFTERNOON WHEN CIRCUMSTANCES PERMIT: MODERATE CHANCE OF DOZING
HOW LIKELY ARE YOU TO NOD OFF OR FALL ASLEEP WHILE SITTING INACTIVE IN A PUBLIC PLACE: WOULD NEVER DOZE
HOW LIKELY ARE YOU TO NOD OFF OR FALL ASLEEP WHILE SITTING QUIETLY AFTER LUNCH WITHOUT ALCOHOL: WOULD NEVER DOZE
HOW LIKELY ARE YOU TO NOD OFF OR FALL ASLEEP WHEN YOU ARE A PASSENGER IN A CAR FOR AN HOUR WITHOUT A BREAK: WOULD NEVER DOZE
HOW LIKELY ARE YOU TO NOD OFF OR FALL ASLEEP WHILE SITTING AND READING: MODERATE CHANCE OF DOZING
HOW LIKELY ARE YOU TO NOD OFF OR FALL ASLEEP IN A CAR, WHILE STOPPED FOR A FEW MINUTES IN TRAFFIC: WOULD NEVER DOZE

## 2025-04-29 ASSESSMENT — PAIN SCALES - GENERAL: PAINLEVEL_OUTOF10: MODERATE PAIN (6)

## 2025-04-29 NOTE — PROGRESS NOTES
Virtual Visit Details    Type of service:  Video Visit   Video start 7:58 AM  Video end 8:24 AM  Originating Location (pt. Location): Parked in car    Distant Location (provider location):  Off-site  Platform used for Video Visit: [facility]    Outpatient Sleep Medicine Consultation:      Name: Constance Frost MRN# [id number]   Age: 49 year old YOB: 1975     Date of Consultation: April 29, 2025  Consultation is requested by: Shante Grimes MD  [address] [place] [address] Shante Grimes  Primary care provider: Shante Grimes       Reason for Sleep Consult:     Constance Frost is sent by Shante Grimes for a sleep consultation regarding 1. Snoring    Chief Complaint   Patient presents with    Consult          Assessment and Plan:     Summary Sleep Diagnoses:  1. Snoring (Primary)  2. Gasping for breath in sleep  3. Fatigue, unspecified type  Comorbid Diagnoses:  4. Anxiety    Constance Frost is a 49-year-old male who presents to clinic today interested in pursuing in-lab polysomnography for evaluation of suspected sleep apnea.  [facility] and [facility] was recommended at that time but was not interested then, now ready to pursue.  Father recently had a stroke and was diagnosed with A-fib, MAURICIO and he would like to address any MAURICIO he may have.  Wife complains of loud disruptive snoring and he will wake himself up gasping almost nightly around 3 AM, no witnessed apneic events by wife.  Denies any daytime sleepiness with ESS 6.  Order for in-lab polysomnogram evaluation placed given failed HST and will plan to see him back following testing to review results together, though did not discuss in detail today did appear open to starting on CPAP if indicated for him as he would like to limit cardiovascular risk given father's recent history.  Education materials provided in AVS.  - Comprehensive Sleep Study; Future         History  "of Present Illness:     Past Sleep Evaluations: [facility] (187#) was unrevealing with AHI 2.7, supine AHI 5, nonsupine AHI 1.3, YOGESH 2.2, oxygen alan 85% though total sleep time limited to 3 hours 47 minutes.  PSG recommended at that time but not completed.    Presents to my clinic today interested in pursuing PSG.  Patient states \"I wake up a lot at 3:11AM and it doesn't feel like I am getting REM sleep\". Snoring \"horribly bad\" per wife. Sometimes sleeping separately from wife due to snoring. No witnessed apneas but \"panting\" per wife. He can wake gasping for air nightly around 3AM and hard to fall back asleep.     Current sleep schedule includes bedtime sometime between 10:00PM-12:00AM with sleep latency 5-10 minutes and wake time 4-4:30AM. If he wakes with a gasp around 3AM may or may not fall back asleep, often does not. Admits \"I have anxiety pretty bad\". Starts work at 5AM, works [employer] and [employer] for [employer] as  and off work at 3:30PM.  Will relax for about 30 minutes once he gets home \"to separate my work brain from my family brain\" but denies any napping.  Sleep schedule is the same on weekends but if he wakes around 3 AM will normally go to the couch and try to force himself to go back to sleep, sometimes it takes a few hours but will end up sleeping another couple hours and starts day around 8-9:00AM.     He had a total [place] Sleepiness Scale of (Proxy-Rptd) 6 (04/29/25 8238), with scores of 10 or higher indicating abnormal levels of sleepiness.  Feels he has more physical tiredness in his body than sleepiness per se.    He is unaware of any abnormal movements or behaviors in sleep - \"the wife doesn't say much about that other than I can toss and turn and bother her when I am not asleep but not awake yet\". No history of sleepwalking, dream enactment, RLS.    Family History:  Patient has a family member been diagnosed with a " sleep disorder:  Dad with MAURICIO on CPAP     SCALES:    EPWORTH SLEEPINESS SCALE         4/29/2025     7:35 AM    [place] Sleepiness Scale ( RAJAT Martinez  [id number]<br>ESS - [place]/English - Final version - 21 Nov 07 - [facility] Research [place].)   Sitting and reading Moderate chance of dozing   Watching TV Moderate chance of dozing   Sitting, inactive in a public place (e.g. a theatre or a meeting) Would never doze   As a passenger in a car for an hour without a break Would never doze   Lying down to rest in the afternoon when circumstances permit Moderate chance of dozing   Sitting and talking to someone Would never doze   Sitting quietly after a lunch without alcohol Would never doze   In a car, while stopped for a few minutes in traffic Would never doze   [place] Score (MC) 6   [place] Score (Sleep) 6        Proxy-reported         INSOMNIA SEVERITY INDEX (MOISÉS)          4/29/2025     7:34 AM   Insomnia Severity Index (MOISÉS)   Difficulty falling asleep 2    Difficulty staying asleep 2    Problems waking up too early 3    How SATISFIED/DISSATISFIED are you with your CURRENT sleep pattern? 3    How NOTICEABLE to others do you think your sleep problem is in terms of impairing the quality of your life? 4    How WORRIED/DISTRESSED are you about your current sleep problem? 2    To what extent do you consider your sleep problem to INTERFERE with your daily functioning (e.g. daytime fatigue, mood, ability to function at work/daily chores, concentration, memory, mood, etc.) CURRENTLY? 2    MOISÉS Total Score 18        Proxy-reported         Guidelines for Scoring/Interpretation:  Total score categories:  0-7 = No clinically significant insomnia   8-14 = Subthreshold insomnia   15-21 = Clinical insomnia (moderate severity)  22-28 = Clinical insomnia (severe)  Used via courtesy of www.SocialStayealth.va.gov with permission from Dionte Barrera PhD., [facility]    Allergies:    No Known Allergies    Medications:    Current Outpatient  Medications   Medication Sig Dispense Refill    budesonide (PULMICORT) 0.5 MG/2ML neb solution Place 0.5 mg/2 mL budesonide vial in 8 oz normal saline sinus rinse bottle.  Irrigate each nostril with one half of the bottle twice daily. 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     Priority: Medium    Routine general medical examination at a health care facility 01/16/2025     Priority: Medium    Dermatophytosis 06/28/2023     Priority: Medium    Enthesopathy, unspecified 06/28/2023     Priority: Medium    Psoriasis with arthropathy (H) 06/28/2023     Priority: Medium        Past Medical/Surgical History:  Past Medical History:   Diagnosis Date    Tobacco use disorder     quit 3/2015     Past Surgical History:   Procedure Laterality Date    LAPAROSCOPIC APPENDECTOMY         Social History:  Social History     Socioeconomic History    Marital status:      Spouse name: Not on file    Number of children: Not on file    Years of education: Not on file    Highest education level: Not on file   Occupational History    Not on file   Tobacco Use    Smoking status: Former     Current packs/day: 0.00     Average packs/day: 1 pack/day for 21.2 years (21.2 ttl pk-yrs)     Types: Cigarettes     Start date: 1/1/1994     Quit date: 3/20/2015     Years since quitting: 10.1     Passive exposure: Past    Smokeless tobacco: Former     Types: Chew   Vaping Use    Vaping status: Every Day    Substances: Nicotine    Devices: Disposable   Substance and Sexual Activity    Alcohol use: Yes     Comment: 3 times/year - fishing, hunting, 4th  of July    Drug use: Yes     Types: Marijuana    Sexual activity: Not on file   Other Topics Concern    Not on file   Social History Narrative     . 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 5 kids - 2 stepkids - 24, 21, 19, 15, 11. Grandson is 8 months.     Social Drivers of Health     Financial Resource Strain: Low Risk  (1/16/2025)    Financial Resource Strain     Within the past 12 months, have you or your family members you live with been unable to get utilities (heat, electricity) when it was really needed?: No   Food Insecurity: Low Risk  (1/16/2025)    Food Insecurity     Within the past 12 months, did you worry that your food would run out before you got money to buy more?: No     Within the past 12 months, did the food you bought just not last and you didn t have money to get more?: No   Transportation Needs: Low Risk  (1/16/2025)    Transportation Needs     Within the past 12 months, has lack of transportation kept you from medical appointments, getting your medicines, non-medical meetings or appointments, work, or from getting things that you need?: No   Physical Activity: Sufficiently Active (1/16/2025)    Exercise Vital Sign     Days of Exercise per Week: 5 days     Minutes of Exercise per Session: 30 min   Stress: Stress Concern Present (1/16/2025)    [employer] - Occupational Stress Questionnaire     Feeling of Stress : To some extent   Social Connections: Unknown (1/16/2025)    Social Connection and Isolation Panel [NHANES]     Frequency of Communication with Friends and Family: Not on file     Frequency of Social Gatherings with Friends and Family: Once a week     Attends Tenriism Services: Not on file     Active Member of Clubs or Organizations: Not on file     Attends Club or Organization Meetings: Not on file     Marital Status: Not on file   Interpersonal Safety: Low Risk  (1/16/2025)    Interpersonal Safety     Do you  "feel physically and emotionally safe where you currently live?: Yes     Within the past 12 months, have you been hit, slapped, kicked or otherwise physically hurt by someone?: No     Within the past 12 months, have you been humiliated or emotionally abused in other ways by your partner or ex-partner?: No   Housing Stability: Low Risk  (2025)    Housing Stability     Do you have housing? : Yes     Are you worried about losing your housing?: No       Family History:  Family History   Problem Relation Age of Onset    Diabetes Mother     Breast Cancer Mother          in     Hashimoto's thyroiditis Mother     Diabetes Father     Breast Cancer Sister     Hashimoto's thyroiditis Sister     Other Problems  (low testosterone) Brother     Colon Cancer Paternal Grandfather         had lots of cancers, smoke, drank     Physical Examination:  Vitals: Ht 1.74 m (5' 8.5\")   Wt 81.6 kg (180 lb)   BMI 26.97 kg/m    BMI= Body mass index is 26.97 kg/m .  General appearance: Awake, alert, cooperative. Well groomed. Sitting comfortably in chair. In no apparent distress.  HEENT: Head: Normocephalic, atraumatic. Eyes:Conjunctiva clear. Sclera normal. Nose: External appearance without deformity.   Pulmonary:  Able to speak easily in full sentences. No cough or wheeze.   Skin:  No rashes or significant lesions on visible skin.   Neurologic: Alert, oriented x3.   Psychiatric: Mood euthymic. Affect congruent with full range and intensity.           Data: All pertinent previous laboratory data reviewed     Recent Labs   Lab Test 24  0000   CR 0.8       No results for input(s): \"WBC\", \"RBC\", \"HGB\", \"HCT\", \"MCV\", \"MCH\", \"MCHC\", \"RDW\", \"PLT\" in the last 27019 hours.    Recent Labs   Lab Test 24  0000   ALKPHOS 41   AST 25   ALT 49       No results found for: \"TSH\"    No results found for: \"UAMP\", \"UBARB\", \"BENZODIAZEUR\", \"UCANN\", \"UCOC\", \"OPIT\", \"UPCP\"    No results found for: \"IRONSAT\", \"LR43979\", \"MARYCARMEN\"    No results " "found for: \"PH\", \"PHARTERIAL\", \"PO2\", \"EV5GLDPZFEO\", \"SAT\", \"PCO2\", \"HCO3\", \"BASEEXCESS\", \"CARLOS\", \"BEB\"    @LABRCNTIPR(phv:4,pco2v:4,po2v:4,hco3v:4,shellie:4,o2per:4)@    Echocardiology: No results found for this or any previous visit (from the past 4320 hours).    Chest x-ray: No results found for this or any previous visit from the past 365 days.      Chest CT: No results found for this or any previous visit from the past 365 days.      PFT: Most Recent [facility]ze Pulmonary Function Testing    No results found for: \"20001\"  No results found for: \"20002\"  No results found for: \"20003\"  No results found for: \"20015\"  No results found for: \"20016\"  No results found for: \"20027\"  No results found for: \"20028\"  No results found for: \"20029\"  No results found for: \"20079\"  No results found for: \"20080\"  No results found for: \"20081\"  No results found for: \"20335\"  No results found for: \"20105\"  No results found for: \"20053\"  No results found for: \"20054\"  No results found for: \"20055\"      Day Dumont PA-C 4/29/2025     [facility]  [address] [address]     [facility]  [address] [address]    Chart documentation was completed, in part, with BotScanner voice-recognition software. Even though reviewed, some grammatical, spelling, and word errors may remain.    46 minutes spent on day of encounter reviewing medical records, history and physical examination, review of previous testing and interpretation, documentation and further activities as noted above  "

## 2025-04-29 NOTE — NURSING NOTE
Current patient location:  [facility][1] AREA    Is the patient currently in the state of [place]? YES    Visit mode: VIDEO    If the visit is dropped, the patient can be reconnected by:VIDEO VISIT: Text to cell phone:   Telephone Information:   Mobile [phone]       Will anyone else be joining the visit? NO  (If patient encounters technical issues they should call [phone]    Are changes needed to the allergy or medication list? OMEPRAZOLE and multi-vitamin    Are refills needed on medications prescribed by this physician? NO    Rooming Documentation:  .     VF Required questionnaires complete.    Reason for visit: Consult    Muriel MCKAY

## 2025-04-29 NOTE — PATIENT INSTRUCTIONS
"          MY TREATMENT INFORMATION FOR SLEEP APNEA-  Constance Frost      Frequently asked questions:  1. What is Obstructive Sleep Apnea (MAURICIO)? MAURICIO is the most common type of sleep apnea. Apnea means, \"without breath.\"  Apnea is most often caused by narrowing or collapse of the upper airway as muscles relax during sleep.   Almost everyone has occasional apneas. Most people with sleep apnea have had brief interruptions at night frequently for many years.  The severity of sleep apnea is related to how frequent and severe the events are.   2. What are the consequences of MAURICIO? Symptoms include: feeling sleepy during the day, snoring loudly, gasping or stopping of breathing, trouble sleeping, and occasionally morning headaches or heartburn at night.  Sleepiness can be serious and even increase the risk of falling asleep while driving. Other health consequences may include development of high blood pressure and other cardiovascular disease in persons who are susceptible. Untreated MAURICIO  can contribute to heart disease, stroke and diabetes.   3. What are the treatment options? In most situations, sleep apnea is a lifelong disease that must be managed with daily therapy. Medications are not effective for sleep apnea and surgery is generally not considered until other therapies have been tried. Your treatment is your choice . Continuous Positive Airway (CPAP) works right away and is the therapy that is effective in nearly everyone. An oral device to hold your jaw forward is usually the next most reliable option. Other options include postioning devices (to keep you off your back), weight loss, and surgery including a tongue pacing device. There is more detail about some of these options below.  4. Are my sleep studies covered by insurance? Although we will request verification of coverage, we advise you also check in advance of the study to ensure there is coverage.    Important tips for those choosing CPAP and similar " devices  REMEMBER-IF YOU RECEIVE A CALL FROM  [phone]-->IT IS TO SETUP A DEVICE  For new devices, sign up for device FLAQUITO to monitor your device for your followup visits  We encourage you to utilize the [employer] flaquito or website ( https://[employer]/ ) to monitor your therapy progress and share the data with your healthcare team when you discuss your sleep apnea.                                                    Know your equipment:  CPAP is continuous positive airway pressure that prevents obstructive sleep apnea by keeping the throat from collapsing while you are sleeping. In most cases, the device is  smart  and can slowly self-adjusts if your throat collapses and keeps a record every day of how well you are treated-this information is available to you and your care team.  BPAP is bilevel positive airway pressure that keeps your throat open and also assists each breath with a pressure boost to maintain adequate breathing.  Special kinds of BPAP are used in patients who have inadequate breathing from lung or heart disease. In most cases, the device is  smart  and can slowly self-adjusts to assist breathing. Like CPAP, the device keeps a record of how well you are treated.  Your mask is your connection to the device. You get to choose what feels most comfortable and the staff will help to make sure if fits. Here: are some examples of the different masks that are available: Magnetic mask aids may assist with use but there are safety issues that should be addressed when considering with magnets* ( see end of discussion).       Key points to remember on your journey with sleep apnea:  Sleep study.  PAP devices often need to be adjusted during a sleep study to show that they are effective and adjusted right.  Good tips to remember: Try wearing just the mask during a quiet time during the day so your body adapts to wearing it. A humidifier is recommended for comfort in most cases to prevent drying of  your nose and throat. Allergy medication from your provider may help you if you are having nasal congestion.  Getting settled-in. It takes more than one night for most of us to get used to wearing a mask. Try wearing just the mask during a quiet time during the day so your body adapts to wearing it. A humidifier is recommended for comfort in most cases. Our team will work with you carefully on the first day and will be in contact within 4 days and again at 2 and 4 weeks for advice and remote device adjustments. Your therapy is evaluated by the device each day.   Use it every night. The more you are able to sleep naturally for 7-8 hours, the more likely you will have good sleep and to prevent health risks or symptoms from sleep apnea. Even if you use it 4 hours it helps. Occasionally all of us are unable to use a medical therapy, in sleep apnea, it is not dangerous to miss one night.   Communicate. Call our skilled team on the number provided on the first day if your visit for problems that make it difficult to wear the device. Over 2 out of 3 patients can learn to wear the device long-term with help from our team. Remember to call our team or your sleep providers if you are unable to wear the device as we may have other solutions for those who cannot adapt to mask CPAP therapy. It is recommended that you sleep your sleep provider within the first 3 months and yearly after that if you are not having problems.   Use it for your health. We encourage use of CPAP masks during daytime quiet periods to allow your face and brain to adapt to the sensation of CPAP so that it will be a more natural sensation to awaken to at night or during naps. This can be very useful during the first few weeks or months of adapting to CPAP though it does not help medically to wear CPAP during wakefulness and  should not be used as a strategy just to meet guidelines.  Take care of your equipment. Make sure you clean your mask and tubing using  directions every day and that your filter and mask are replaced as recommended or if they are not working.     *Masks with magnets:  Updated Contraindications  Masks with magnetic components are contraindicated for use by patients where they, or anyone in close physical contact while using the mask, have the following:   Active medical implants that interact with magnets (i.e., pacemakers, implantable cardioverter defibrillators (ICD), neurostimulators, cerebrospinal fluid (CSF) shunts, insulin/infusion pumps)   Metallic implants/objects containing ferromagnetic material (i.e., aneurysm clips/flow disruption devices, embolic coils, stents, valves, electrodes, implants to restore hearing or balance with implanted magnets, ocular implants, metallic splinters in the eye)  Updated Warning  Keep the mask magnets at a safe distance of at least 6 inches (150 mm) away from implants or medical devices that may be adversely affected by magnetic interference. This warning applies to you or anyone in close physical contact with your mask. The magnets are in the frame and lower headgear clips, with a magnetic field strength of up to 400mT. When worn, they connect to secure the mask but may inadvertently detach while asleep.  Implants/medical devices, including those listed within contraindications, may be adversely affected if they change function under external magnetic fields or contain ferromagnetic materials that attract/repel to magnetic fields (some metallic implants, e.g., contact lenses with metal, dental implants, metallic cranial plates, screws, tommy hole covers, and bone substitute devices). Consult your physician and  of your implant / other medical device for information on the potential adverse effects of magnetic fields.    BESIDES CPAP, WHAT OTHER THERAPIES ARE THERE?    Positioning Device  Positioning devices are generally used when sleep apnea is mild and only occurs on your back.This example shows  a pillow that straps around the waist. It may be appropriate for those whose sleep study shows milder sleep apnea that occurs primarily when lying flat on one's back. Preliminary studies have shown benefit but effectiveness at home may need to be verified by a home sleep test. These devices are generally not covered by medical insurance.  Examples of devices that maintain sleeping on the back to prevent snoring and mild sleep apnea.    Belt type body positioner  http://Script[employer]/    Electronic reminder  http://nightshifttherapy.com/            Oral Appliance  What is oral appliance therapy?  An oral appliance device fits on your teeth at night like a retainer used after having braces. The device is made by a specialized dentist and requires several visits over 1-2 months before a manufactured device is made to fit your teeth and is adjusted to prevent your sleep apnea. Once an oral device is working properly, snoring should be improved. A home sleep test may be recommended at that time if to determine whether the sleep apnea is adequately treated.       Some things to remember:  -Oral devices are often, but not always, covered by your medical insurance. Be sure to check with your insurance provider.   -If you are referred for oral therapy, you will be given a list of specialized dentists to consider or you may choose to visit the Web site of the American Academy of Dental Sleep Medicine  -Oral devices are less likely to work if you have severe sleep apnea or are extremely overweight.     More detailed information  An oral appliance is a small acrylic device that fits over the upper and lower teeth  (similar to a retainer or a mouth guard). This device slightly moves jaw forward, which moves the base of the tongue forward, opens the airway, improves breathing for effective treat snoring and obstructive sleep apnea in perhaps 7 out of 10 people .  The best working devices are custom-made by a dental device   after a mold is made of the teeth 1, 2, 3.  When is an oral appliance indicated?  Oral appliance therapy is recommended as a first-line treatment for patients with primary snoring, mild sleep apnea, and for patients with moderate sleep apnea who prefer appliance therapy to use of CPAP4, 5. Severity of sleep apnea is determined by sleep testing and is based on the number of respiratory events per hour of sleep.   How successful is oral appliance therapy?  The success rate of oral appliance therapy in patients with mild sleep apnea is 75-80% while in patients with moderate sleep apnea it is 50-70%. The chance of success in patients with severe sleep apnea is 40-50%. The research also shows that oral appliances have a beneficial effect on the cardiovascular health of MAURICIO patients at the same magnitude as CPAP therapy7.  Oral appliances should be a second-line treatment in cases of severe sleep apnea, but if not completely successful then a combination therapy utilizing CPAP plus oral appliance therapy may be effective. Oral appliances tend to be effective in a broad range of patients although studies show that the patients who have the highest success are females, younger patients, those with milder disease, and less severe obesity. 3, 6.   Finding a dentist that practices dental sleep medicine  Specific training is available through the American Academy of Dental Sleep Medicine for dentists interested in working in the field of sleep. To find a dentist who is educated in the field of sleep and the use of oral appliances, near you, visit the Web site of the American Academy of Dental Sleep Medicine.    References  1. Hayley, et al. Objectively measured vs self-reported compliance during oral appliance therapy for sleep-disordered breathing. Chest 2013; 144(5): 3986-7252.  2. Luis Manuel et al. Objective measurement of compliance during oral appliance therapy for sleep-disordered breathing. Thorax  2013; 68(1): 91-96.  3. Celestino et al. Mandibular advancement devices in 620 men and women with MAURICIO and snoring: tolerability and predictors of treatment success. Chest 2004; 125: 6061-8668.  4. Christy et al. Oral appliances for snoring and MAURICIO: a review. Sleep 2006; 29: 244-262.  5. Latia et al. Oral appliance treatment for MAURICIO: an update. J Clin Sleep Med 2014; 10(2): 215-227.  6. Ann et al. Predictors of OSAH treatment outcome. J Dent Res 2007; 86: 8317-8642.      Weight Loss:   Your Body mass index is 26.97 kg/m .    Being overweight does not necessarily mean you will have health consequences.  Those who have BMI over 30 or over 27 with existing medical conditions carries greater risk.   Weight loss decreases severity of sleep apnea in most people with obesity. For those with mild obesity who have developed snoring with weight gain, even 15-30 pound weight loss can improve and occasionally milder eliminate sleep apnea.  Structured and life-long dietary and health habits are necessary to lose weight and keep healthier weight levels.     The Comprehensive Weight loss program offers all aspects of weight loss strategies including two Non-Surgical Weight Loss Programs: Medical Weight Management and our 24 Week Healthy Lifestyle Program:  Medical Weight Management: You will meet with a Medical Weight Management Provider, as well as a Registered Dietician. The program may include medication therapy, dietary education, recommended exercise and physical therapy programs, monthly support group meetings, and possible psychological counseling. Follow up visits with the provider or dietician are scheduled based on your progress and needs.  24 Week Healthy Lifestyle Program: This unique program is designed to give you the support of weekly appointments and activities thru a 24-week period. It may include all of the components of the basic program (above), with the addition of 11 individual Health   Visits, 24-week access to the [employer] website for over 700 online classes, and monthly support group meetings. This program has an out-of-pocket expense of $499 to cover the items that can not be billed to insurance (health coaches and [employer] access), and is non-refundable/non-transferable (you may be able to use a Health Savings Account; ask your HSA provider). There may be an optional meal replacement plan prescribed as well.   Medication therapy has been approved for the treatment of sleep apnea: The FDA approved tirzepatide (ZEPBOUND) for moderate to severe sleep apnea (apnea-hypopnea index greater than or equal to 15) in patients with BMI of greater than or equal to 30, or BMI greater than or equal to 27 with at least 1 weight-related condition such as hypertension or dyslipidemia.  Surgical management achieves meaningful long-term weight loss and improvement in health risks in most patients with more severe obesity.      Sleep Apnea Surgery:    Surgery for obstructive sleep apnea is considered generally only when other therapies fail to work. Surgery may be discussed with you if you are having a difficult time tolerating CPAP and or when there is an abnormal structure that requires surgical correction.  Nose and throat surgeries often enlarge the airway to prevent collapse.  Most of these surgeries create pain for 1-2 weeks and up to half of the most common surgeries are not effective throughout life.  You should carefully discuss the benefits and drawbacks to surgery with your sleep provider and surgeon to determine if it is the best solution for you.   More information  Surgery for MAURICIO is directed at areas that are responsible for narrowing or complete obstruction of the airway during sleep.  There are a wide range of procedures available to enlarge and/or stabilize the airway to prevent blockage of breathing in the three major areas where it can occur: the palate, tongue, and nasal regions.  Successful  surgical treatment depends on the accurate identification of the factors responsible for obstructive sleep apnea in each person.  A personalized approach is required because there is no single treatment that works well for everyone.  Because of anatomic variation, consultation with an examination by a sleep surgeon is a critical first step in determining what surgical options are best for each patient.  In some cases, examination during sedation may be recommended in order to guide the selection of procedures.  Patients will be counseled about risks and benefits as well as the typical recovery course after surgery. Surgery is typically not a cure for a person s MAURICIO.  However, surgery will often significantly improve one s MAURICIO severity (termed  success rate ).  Even in the absence of a cure, surgery will decrease the cardiovascular risk associated with OSA7; improve overall quality of life8 (sleepiness, functionality, sleep quality, etc).      Palate Procedures:  Patients with MAURICIO often have narrowing of their airway in the region of their tonsils and uvula.  The goals of palate procedures are to widen the airway in this region as well as to help the tissues resist collapse.  Modern palate procedure techniques focus on tissue conservation and soft tissue rearrangement, rather than tissue removal.  Often the uvula is preserved in this procedure. Residual sleep apnea is common in patient after pharyngoplasty with an average reduction in sleep apnea events of 33%2.      Tongue Procedures:  ExamWhile patients are awake, the muscles that surround the throat are active and keep this region open for breathing. These muscles relax during sleep, allowing the tongue and other structures to collapse and block breathing.  There are several different tongue procedures available.  Selection of a tongue base procedure depends on characteristics seen on physical exam.  Generally, procedures are aimed at removing bulky tissues in  this area or preventing the back of the tongue from falling back during sleep.  Success rates for tongue surgery range from 50-62%3.    Hypoglossal Nerve Stimulation:  Hypoglossal nerve stimulation has recently received approval from the [place] Food and Drug Administration for the treatment of obstructive sleep apnea.  This is based on research showing that the system was safe and effective in treating sleep apnea6.  Results showed that the median AHI score decreased 68%, from 29.3 to 9.0. This therapy uses an implant system that senses breathing patterns and delivers mild stimulation to airway muscles, which keeps the airway open during sleep.  The system consists of three fully implanted components: a small generator (similar in size to a pacemaker), a breathing sensor, and a stimulation lead.  Using a small handheld remote, a patient turns the therapy on before bed and off upon awakening.    Candidates for this device must be greater than 18 years of age, have moderate to severe obstructive sleep apnea with less than 25% central events  (AHI between 15-65), BMI less than 35, have tried CPAP/oral appliance for at least 8 weeks without success, and have appropriate upper airway anatomy (determined by a sleep endoscopy performed by Dr. Shyam Chowdhury or Dr. Declan Diop).     Nasal Procedures:  Nasal obstruction can interfere with nasal breathing during the day and night.  Studies have shown that relief of nasal obstruction can improve the ability of some patients to tolerate positive airway pressure therapy for obstructive sleep apnea1.  Treatment options include medications such as nasal saline, topical corticosteroid and antihistamine sprays, and oral medications such as antihistamines or decongestants. Non-surgical treatments can include external nasal dilators for selected patients. If these are not successful by themselves, surgery can improve the nasal airway either alone or in combination with these  other options.        Combination Procedures:  Combination of surgical procedures and other treatments may be recommended, particularly if patients have more than one area of narrowing or persistent positional disease.  The success rate of combination surgery ranges from 66-80%2,3.    References  Tamiko LIRA. The Role of the Nose in Snoring and Obstructive Sleep Apnoea: An Update.  Eur Arch Otorhinolaryngol. 2011; 268: 1365-73.   Jose Daniel SM; Marquis JA; Yessica JR; Pallanch JF; Manny MB; Lester SG; Hiral LOREDO. Surgical modifications of the upper airway for obstructive sleep apnea in adults: a systematic review and meta-analysis. SLEEP 2010;33(10):9135-0487. Andres MALCOLM. Hypopharyngeal surgery in obstructive sleep apnea: an evidence-based medicine review.  Arch Otolaryngol Head Neck Surg. 2006 Feb;132(2):206-13.  Rosendo YH1, Srini Y, Frank GIN. The efficacy of anatomically based multilevel surgery for obstructive sleep apnea. Otolaryngol Head Neck Surg. 2003 Oct;129(4):327-35.  Kezirian E, Goldberg A. Hypopharyngeal Surgery in Obstructive Sleep Apnea: An Evidence-Based Medicine Review. Arch Otolaryngol Head Neck Surg. 2006 Feb;132(2):206-13.  Saray ACEVES et al. Upper-Airway Stimulation for Obstructive Sleep Apnea.  N Engl J Med. 2014 Jan 9;370(2):139-49.  Alphonse Y et al. Increased Incidence of Cardiovascular Disease in Middle-aged Men with Obstructive Sleep Apnea. Am J Respir Crit Care Med; 2002 166: 159-165  Miller EM et al. Studying Life Effects and Effectiveness of Palatopharyngoplasty (SLEEP) study: Subjective Outcomes of Isolated Uvulopalatopharyngoplasty. Otolaryngol Head Neck Surg. 2011; 144: 623-631.        WHAT IF I ONLY HAVE SNORING?    Mandibular advancement devices, lateral sleep positioning, long-term weight loss and treatment of nasal allergies have been shown to improve snoring.  Exercising tongue muscles with a game (https://apps."[employer]/us/flaquito/soundly-reduce-snoring/di3407689169) or stimulating the tongue  during the day with a device (https://doi.org/10.3390/lve68329965) have improved snoring in some individuals.  https://www.[employer]/  https://www.sleepfoundation.org/best-anti-snoring-mouthpieces-and-mouthguards    Remember to Drive Safe... Drive Alive     Sleep health profoundly affects your health, mood, and your safety.  Thirty three percent of the population (one in three of us) is not getting enough sleep and many have a sleep disorder. Not getting enough sleep or having an untreated / undertreated sleep condition may make us sleepy without even knowing it. In fact, our driving could be dramatically impaired due to our sleep health. As your provider, here are some things I would like you to know about driving:     Here are some warning signs for impairment and dangerous drowsy driving:              -Having been awake more than 16 hours               -Looking tired               -Eyelid drooping              -Head nodding (it could be too late at this point)              -Driving for more than 30 minutes     Some things you could do to make the driving safer if you are experiencing some drowsiness:              -Stop driving and rest              -Call for transportation              -Make sure your sleep disorder is adequately treated     Some things that have been shown NOT to work when experiencing drowsiness while driving:              -Turning on the radio              -Opening windows              -Eating any  distracting  /  entertaining  foods (e.g., sunflower seeds, candy, or any other)              -Talking on the phone      Your decision may not only impact your life, but also the life of others. Please, remember to drive safe for yourself and all of us.